# Patient Record
Sex: FEMALE | Race: WHITE | Employment: FULL TIME | URBAN - METROPOLITAN AREA
[De-identification: names, ages, dates, MRNs, and addresses within clinical notes are randomized per-mention and may not be internally consistent; named-entity substitution may affect disease eponyms.]

---

## 2017-04-04 ENCOUNTER — COMMUNICATION - HEALTHEAST (OUTPATIENT)
Dept: INTERNAL MEDICINE | Facility: CLINIC | Age: 42
End: 2017-04-04

## 2017-04-06 ENCOUNTER — AMBULATORY - HEALTHEAST (OUTPATIENT)
Dept: INTERNAL MEDICINE | Facility: CLINIC | Age: 42
End: 2017-04-06

## 2017-04-06 DIAGNOSIS — E11.9 TYPE 2 DIABETES MELLITUS (H): ICD-10-CM

## 2017-04-06 DIAGNOSIS — E11.9 DIABETES MELLITUS TYPE 2, CONTROLLED (H): ICD-10-CM

## 2017-04-09 ENCOUNTER — COMMUNICATION - HEALTHEAST (OUTPATIENT)
Dept: INTERNAL MEDICINE | Facility: CLINIC | Age: 42
End: 2017-04-09

## 2017-04-09 DIAGNOSIS — E11.9 TYPE 2 DIABETES MELLITUS (H): ICD-10-CM

## 2017-05-06 ENCOUNTER — COMMUNICATION - HEALTHEAST (OUTPATIENT)
Dept: INTERNAL MEDICINE | Facility: CLINIC | Age: 42
End: 2017-05-06

## 2017-05-06 DIAGNOSIS — E11.9 TYPE 2 DIABETES MELLITUS (H): ICD-10-CM

## 2017-06-17 ENCOUNTER — COMMUNICATION - HEALTHEAST (OUTPATIENT)
Dept: INTERNAL MEDICINE | Facility: CLINIC | Age: 42
End: 2017-06-17

## 2017-06-17 DIAGNOSIS — E11.9 DIABETES (H): ICD-10-CM

## 2017-08-01 ENCOUNTER — COMMUNICATION - HEALTHEAST (OUTPATIENT)
Dept: INTERNAL MEDICINE | Facility: CLINIC | Age: 42
End: 2017-08-01

## 2017-08-14 ENCOUNTER — RECORDS - HEALTHEAST (OUTPATIENT)
Dept: ADMINISTRATIVE | Facility: OTHER | Age: 42
End: 2017-08-14

## 2017-10-15 ENCOUNTER — COMMUNICATION - HEALTHEAST (OUTPATIENT)
Dept: INTERNAL MEDICINE | Facility: CLINIC | Age: 42
End: 2017-10-15

## 2017-10-15 DIAGNOSIS — E11.9 DIABETES (H): ICD-10-CM

## 2017-11-02 ENCOUNTER — COMMUNICATION - HEALTHEAST (OUTPATIENT)
Dept: INTERNAL MEDICINE | Facility: CLINIC | Age: 42
End: 2017-11-02

## 2017-11-02 DIAGNOSIS — E78.5 HYPERLIPIDEMIA: ICD-10-CM

## 2018-01-12 ENCOUNTER — COMMUNICATION - HEALTHEAST (OUTPATIENT)
Dept: INTERNAL MEDICINE | Facility: CLINIC | Age: 43
End: 2018-01-12

## 2018-01-12 DIAGNOSIS — E11.9 TYPE 2 DIABETES MELLITUS (H): ICD-10-CM

## 2018-02-13 ENCOUNTER — COMMUNICATION - HEALTHEAST (OUTPATIENT)
Dept: INTERNAL MEDICINE | Facility: CLINIC | Age: 43
End: 2018-02-13

## 2018-02-13 DIAGNOSIS — E78.5 HYPERLIPIDEMIA: ICD-10-CM

## 2018-02-17 ENCOUNTER — COMMUNICATION - HEALTHEAST (OUTPATIENT)
Dept: INTERNAL MEDICINE | Facility: CLINIC | Age: 43
End: 2018-02-17

## 2018-02-17 DIAGNOSIS — E11.9 DIABETES (H): ICD-10-CM

## 2018-03-19 ENCOUNTER — COMMUNICATION - HEALTHEAST (OUTPATIENT)
Dept: INTERNAL MEDICINE | Facility: CLINIC | Age: 43
End: 2018-03-19

## 2018-03-19 DIAGNOSIS — E78.5 HYPERLIPIDEMIA: ICD-10-CM

## 2018-03-19 DIAGNOSIS — E11.9 DIABETES (H): ICD-10-CM

## 2018-04-15 ENCOUNTER — COMMUNICATION - HEALTHEAST (OUTPATIENT)
Dept: INTERNAL MEDICINE | Facility: CLINIC | Age: 43
End: 2018-04-15

## 2018-04-15 DIAGNOSIS — E11.9 TYPE 2 DIABETES MELLITUS (H): ICD-10-CM

## 2020-02-13 ENCOUNTER — OFFICE VISIT - HEALTHEAST (OUTPATIENT)
Dept: INTERNAL MEDICINE | Facility: CLINIC | Age: 45
End: 2020-02-13

## 2020-02-13 DIAGNOSIS — G47.00 INSOMNIA, UNSPECIFIED TYPE: ICD-10-CM

## 2020-02-13 DIAGNOSIS — Z23 NEED FOR IMMUNIZATION AGAINST INFLUENZA: ICD-10-CM

## 2020-02-13 DIAGNOSIS — Z12.31 VISIT FOR SCREENING MAMMOGRAM: ICD-10-CM

## 2020-02-13 DIAGNOSIS — Z72.0 TOBACCO ABUSE: ICD-10-CM

## 2020-02-13 DIAGNOSIS — E66.812 CLASS 2 SEVERE OBESITY DUE TO EXCESS CALORIES WITH SERIOUS COMORBIDITY AND BODY MASS INDEX (BMI) OF 38.0 TO 38.9 IN ADULT (H): ICD-10-CM

## 2020-02-13 DIAGNOSIS — F41.1 ANXIETY STATE: ICD-10-CM

## 2020-02-13 DIAGNOSIS — Z00.00 ROUTINE HISTORY AND PHYSICAL EXAMINATION OF ADULT: ICD-10-CM

## 2020-02-13 DIAGNOSIS — E66.01 CLASS 2 SEVERE OBESITY DUE TO EXCESS CALORIES WITH SERIOUS COMORBIDITY AND BODY MASS INDEX (BMI) OF 38.0 TO 38.9 IN ADULT (H): ICD-10-CM

## 2020-02-13 DIAGNOSIS — E78.2 MIXED HYPERLIPIDEMIA: ICD-10-CM

## 2020-02-13 DIAGNOSIS — E11.69 TYPE 2 DIABETES MELLITUS WITH OTHER SPECIFIED COMPLICATION, WITHOUT LONG-TERM CURRENT USE OF INSULIN (H): ICD-10-CM

## 2020-02-13 LAB
ALBUMIN SERPL-MCNC: 4 G/DL (ref 3.5–5)
ALP SERPL-CCNC: 108 U/L (ref 45–120)
ALT SERPL W P-5'-P-CCNC: 28 U/L (ref 0–45)
ANION GAP SERPL CALCULATED.3IONS-SCNC: 9 MMOL/L (ref 5–18)
AST SERPL W P-5'-P-CCNC: 15 U/L (ref 0–40)
BASOPHILS # BLD AUTO: 0.1 THOU/UL (ref 0–0.2)
BASOPHILS NFR BLD AUTO: 1 % (ref 0–2)
BILIRUB SERPL-MCNC: 0.4 MG/DL (ref 0–1)
BUN SERPL-MCNC: 11 MG/DL (ref 8–22)
CALCIUM SERPL-MCNC: 9.5 MG/DL (ref 8.5–10.5)
CHLORIDE BLD-SCNC: 102 MMOL/L (ref 98–107)
CHOLEST SERPL-MCNC: 208 MG/DL
CO2 SERPL-SCNC: 26 MMOL/L (ref 22–31)
CREAT SERPL-MCNC: 0.7 MG/DL (ref 0.6–1.1)
CREAT UR-MCNC: 147.6 MG/DL
EOSINOPHIL # BLD AUTO: 0.3 THOU/UL (ref 0–0.4)
EOSINOPHIL NFR BLD AUTO: 3 % (ref 0–6)
ERYTHROCYTE [DISTWIDTH] IN BLOOD BY AUTOMATED COUNT: 11.1 % (ref 11–14.5)
FASTING STATUS PATIENT QL REPORTED: YES
GFR SERPL CREATININE-BSD FRML MDRD: >60 ML/MIN/1.73M2
GLUCOSE BLD-MCNC: 200 MG/DL (ref 70–125)
HBA1C MFR BLD: 9.8 % (ref 3.5–6)
HCT VFR BLD AUTO: 48.2 % (ref 35–47)
HDLC SERPL-MCNC: 40 MG/DL
HGB BLD-MCNC: 15.7 G/DL (ref 12–16)
LDLC SERPL CALC-MCNC: 122 MG/DL
LYMPHOCYTES # BLD AUTO: 3 THOU/UL (ref 0.8–4.4)
LYMPHOCYTES NFR BLD AUTO: 26 % (ref 20–40)
MCH RBC QN AUTO: 30.3 PG (ref 27–34)
MCHC RBC AUTO-ENTMCNC: 32.5 G/DL (ref 32–36)
MCV RBC AUTO: 93 FL (ref 80–100)
MICROALBUMIN UR-MCNC: 2.67 MG/DL (ref 0–1.99)
MICROALBUMIN/CREAT UR: 18.1 MG/G
MONOCYTES # BLD AUTO: 0.6 THOU/UL (ref 0–0.9)
MONOCYTES NFR BLD AUTO: 6 % (ref 2–10)
NEUTROPHILS # BLD AUTO: 7.7 THOU/UL (ref 2–7.7)
NEUTROPHILS NFR BLD AUTO: 66 % (ref 50–70)
PLATELET # BLD AUTO: 295 THOU/UL (ref 140–440)
PMV BLD AUTO: 9 FL (ref 7–10)
POTASSIUM BLD-SCNC: 4.3 MMOL/L (ref 3.5–5)
PROT SERPL-MCNC: 7.2 G/DL (ref 6–8)
RBC # BLD AUTO: 5.16 MILL/UL (ref 3.8–5.4)
SODIUM SERPL-SCNC: 137 MMOL/L (ref 136–145)
TRIGL SERPL-MCNC: 229 MG/DL
TSH SERPL DL<=0.005 MIU/L-ACNC: 2.07 UIU/ML (ref 0.3–5)
URATE SERPL-MCNC: 3.4 MG/DL (ref 2–7.5)
WBC: 11.7 THOU/UL (ref 4–11)

## 2020-02-13 RX ORDER — MIRTAZAPINE 15 MG/1
TABLET, FILM COATED ORAL
Status: SHIPPED | COMMUNITY
Start: 2020-01-01

## 2020-02-13 RX ORDER — DULOXETIN HYDROCHLORIDE 60 MG/1
120 CAPSULE, DELAYED RELEASE ORAL DAILY
Status: SHIPPED | COMMUNITY
Start: 2020-01-01

## 2020-02-13 ASSESSMENT — ANXIETY QUESTIONNAIRES
1. FEELING NERVOUS, ANXIOUS, OR ON EDGE: NEARLY EVERY DAY
4. TROUBLE RELAXING: MORE THAN HALF THE DAYS
IF YOU CHECKED OFF ANY PROBLEMS ON THIS QUESTIONNAIRE, HOW DIFFICULT HAVE THESE PROBLEMS MADE IT FOR YOU TO DO YOUR WORK, TAKE CARE OF THINGS AT HOME, OR GET ALONG WITH OTHER PEOPLE: SOMEWHAT DIFFICULT
3. WORRYING TOO MUCH ABOUT DIFFERENT THINGS: SEVERAL DAYS
GAD7 TOTAL SCORE: 10
5. BEING SO RESTLESS THAT IT IS HARD TO SIT STILL: MORE THAN HALF THE DAYS
7. FEELING AFRAID AS IF SOMETHING AWFUL MIGHT HAPPEN: NOT AT ALL
6. BECOMING EASILY ANNOYED OR IRRITABLE: SEVERAL DAYS
2. NOT BEING ABLE TO STOP OR CONTROL WORRYING: SEVERAL DAYS

## 2020-02-13 ASSESSMENT — PATIENT HEALTH QUESTIONNAIRE - PHQ9: SUM OF ALL RESPONSES TO PHQ QUESTIONS 1-9: 9

## 2020-02-14 ENCOUNTER — COMMUNICATION - HEALTHEAST (OUTPATIENT)
Dept: INTERNAL MEDICINE | Facility: CLINIC | Age: 45
End: 2020-02-14

## 2020-02-14 ENCOUNTER — AMBULATORY - HEALTHEAST (OUTPATIENT)
Dept: INTERNAL MEDICINE | Facility: CLINIC | Age: 45
End: 2020-02-14

## 2020-02-14 DIAGNOSIS — R80.9 MICROALBUMINURIA DUE TO TYPE 2 DIABETES MELLITUS (H): ICD-10-CM

## 2020-02-14 DIAGNOSIS — E11.69 TYPE 2 DIABETES MELLITUS WITH OTHER SPECIFIED COMPLICATION, WITHOUT LONG-TERM CURRENT USE OF INSULIN (H): ICD-10-CM

## 2020-02-14 DIAGNOSIS — E11.29 MICROALBUMINURIA DUE TO TYPE 2 DIABETES MELLITUS (H): ICD-10-CM

## 2020-02-14 DIAGNOSIS — E78.2 MIXED HYPERLIPIDEMIA: ICD-10-CM

## 2020-02-14 LAB — 25(OH)D3 SERPL-MCNC: 29.4 NG/ML (ref 30–80)

## 2020-02-20 ENCOUNTER — COMMUNICATION - HEALTHEAST (OUTPATIENT)
Dept: INTERNAL MEDICINE | Facility: CLINIC | Age: 45
End: 2020-02-20

## 2020-02-20 ENCOUNTER — OFFICE VISIT - HEALTHEAST (OUTPATIENT)
Dept: INTERNAL MEDICINE | Facility: CLINIC | Age: 45
End: 2020-02-20

## 2020-02-20 ENCOUNTER — RECORDS - HEALTHEAST (OUTPATIENT)
Dept: GENERAL RADIOLOGY | Facility: CLINIC | Age: 45
End: 2020-02-20

## 2020-02-20 DIAGNOSIS — R80.9 MICROALBUMINURIA DUE TO TYPE 2 DIABETES MELLITUS (H): ICD-10-CM

## 2020-02-20 DIAGNOSIS — M25.562 CHRONIC PAIN OF LEFT KNEE: ICD-10-CM

## 2020-02-20 DIAGNOSIS — F41.1 ANXIETY STATE: ICD-10-CM

## 2020-02-20 DIAGNOSIS — E11.69 TYPE 2 DIABETES MELLITUS WITH OTHER SPECIFIED COMPLICATION, WITHOUT LONG-TERM CURRENT USE OF INSULIN (H): ICD-10-CM

## 2020-02-20 DIAGNOSIS — E78.2 MIXED HYPERLIPIDEMIA: ICD-10-CM

## 2020-02-20 DIAGNOSIS — M17.12 PRIMARY OSTEOARTHRITIS OF LEFT KNEE: ICD-10-CM

## 2020-02-20 DIAGNOSIS — G47.00 INSOMNIA, UNSPECIFIED TYPE: ICD-10-CM

## 2020-02-20 DIAGNOSIS — G89.29 CHRONIC PAIN OF LEFT KNEE: ICD-10-CM

## 2020-02-20 DIAGNOSIS — Z72.0 TOBACCO ABUSE: ICD-10-CM

## 2020-02-20 DIAGNOSIS — E66.812 CLASS 2 SEVERE OBESITY DUE TO EXCESS CALORIES WITH SERIOUS COMORBIDITY AND BODY MASS INDEX (BMI) OF 38.0 TO 38.9 IN ADULT (H): ICD-10-CM

## 2020-02-20 DIAGNOSIS — E11.29 MICROALBUMINURIA DUE TO TYPE 2 DIABETES MELLITUS (H): ICD-10-CM

## 2020-02-20 DIAGNOSIS — E66.01 CLASS 2 SEVERE OBESITY DUE TO EXCESS CALORIES WITH SERIOUS COMORBIDITY AND BODY MASS INDEX (BMI) OF 38.0 TO 38.9 IN ADULT (H): ICD-10-CM

## 2020-02-20 DIAGNOSIS — M17.12 UNILATERAL PRIMARY OSTEOARTHRITIS, LEFT KNEE: ICD-10-CM

## 2020-03-02 ENCOUNTER — COMMUNICATION - HEALTHEAST (OUTPATIENT)
Dept: ADMINISTRATIVE | Facility: CLINIC | Age: 45
End: 2020-03-02

## 2020-03-10 ENCOUNTER — OFFICE VISIT - HEALTHEAST (OUTPATIENT)
Dept: INTERNAL MEDICINE | Facility: CLINIC | Age: 45
End: 2020-03-10

## 2020-03-10 DIAGNOSIS — Z63.6 CAREGIVER BURDEN: ICD-10-CM

## 2020-03-10 SDOH — SOCIAL STABILITY - SOCIAL INSECURITY: DEPENDENT RELATIVE NEEDING CARE AT HOME: Z63.6

## 2020-03-10 ASSESSMENT — MIFFLIN-ST. JEOR: SCORE: 1523

## 2020-05-27 ENCOUNTER — OFFICE VISIT - HEALTHEAST (OUTPATIENT)
Dept: INTERNAL MEDICINE | Facility: CLINIC | Age: 45
End: 2020-05-27

## 2020-05-27 DIAGNOSIS — G47.00 INSOMNIA, UNSPECIFIED TYPE: ICD-10-CM

## 2020-05-27 DIAGNOSIS — I10 BENIGN ESSENTIAL HYPERTENSION: ICD-10-CM

## 2020-05-27 DIAGNOSIS — R80.9 MICROALBUMINURIA DUE TO TYPE 2 DIABETES MELLITUS (H): ICD-10-CM

## 2020-05-27 DIAGNOSIS — F41.1 ANXIETY STATE: ICD-10-CM

## 2020-05-27 DIAGNOSIS — M17.12 OSTEOARTHRITIS OF LEFT KNEE, UNSPECIFIED OSTEOARTHRITIS TYPE: ICD-10-CM

## 2020-05-27 DIAGNOSIS — E11.29 MICROALBUMINURIA DUE TO TYPE 2 DIABETES MELLITUS (H): ICD-10-CM

## 2020-05-27 DIAGNOSIS — E78.2 MIXED HYPERLIPIDEMIA: ICD-10-CM

## 2020-05-27 DIAGNOSIS — E11.29 TYPE 2 DIABETES MELLITUS WITH OTHER DIABETIC KIDNEY COMPLICATION, WITHOUT LONG-TERM CURRENT USE OF INSULIN (H): ICD-10-CM

## 2020-05-27 DIAGNOSIS — Z72.0 TOBACCO ABUSE: ICD-10-CM

## 2020-05-27 DIAGNOSIS — M48.062 SPINAL STENOSIS, LUMBAR REGION, WITH NEUROGENIC CLAUDICATION: ICD-10-CM

## 2020-05-27 DIAGNOSIS — E66.812 CLASS 2 SEVERE OBESITY DUE TO EXCESS CALORIES WITH SERIOUS COMORBIDITY AND BODY MASS INDEX (BMI) OF 38.0 TO 38.9 IN ADULT (H): ICD-10-CM

## 2020-05-27 DIAGNOSIS — E66.01 CLASS 2 SEVERE OBESITY DUE TO EXCESS CALORIES WITH SERIOUS COMORBIDITY AND BODY MASS INDEX (BMI) OF 38.0 TO 38.9 IN ADULT (H): ICD-10-CM

## 2020-05-27 LAB
ANION GAP SERPL CALCULATED.3IONS-SCNC: 10 MMOL/L (ref 5–18)
BUN SERPL-MCNC: 14 MG/DL (ref 8–22)
CALCIUM SERPL-MCNC: 9.6 MG/DL (ref 8.5–10.5)
CHLORIDE BLD-SCNC: 104 MMOL/L (ref 98–107)
CO2 SERPL-SCNC: 21 MMOL/L (ref 22–31)
CREAT SERPL-MCNC: 0.72 MG/DL (ref 0.6–1.1)
GFR SERPL CREATININE-BSD FRML MDRD: >60 ML/MIN/1.73M2
GLUCOSE BLD-MCNC: 238 MG/DL (ref 70–125)
HBA1C MFR BLD: 9.7 % (ref 3.5–6)
POTASSIUM BLD-SCNC: 4.9 MMOL/L (ref 3.5–5)
SODIUM SERPL-SCNC: 135 MMOL/L (ref 136–145)

## 2020-05-27 RX ORDER — BENZTROPINE MESYLATE 0.5 MG/1
TABLET ORAL
Status: SHIPPED | COMMUNITY
Start: 2020-05-20

## 2020-05-28 ENCOUNTER — COMMUNICATION - HEALTHEAST (OUTPATIENT)
Dept: INTERNAL MEDICINE | Facility: CLINIC | Age: 45
End: 2020-05-28

## 2020-05-28 ENCOUNTER — AMBULATORY - HEALTHEAST (OUTPATIENT)
Dept: INTERNAL MEDICINE | Facility: CLINIC | Age: 45
End: 2020-05-28

## 2020-05-28 DIAGNOSIS — E11.29 TYPE 2 DIABETES MELLITUS WITH OTHER DIABETIC KIDNEY COMPLICATION, WITHOUT LONG-TERM CURRENT USE OF INSULIN (H): ICD-10-CM

## 2020-08-27 ENCOUNTER — OFFICE VISIT - HEALTHEAST (OUTPATIENT)
Dept: INTERNAL MEDICINE | Facility: CLINIC | Age: 45
End: 2020-08-27

## 2020-08-27 ENCOUNTER — COMMUNICATION - HEALTHEAST (OUTPATIENT)
Dept: INTERNAL MEDICINE | Facility: CLINIC | Age: 45
End: 2020-08-27

## 2020-08-27 DIAGNOSIS — G47.00 INSOMNIA, UNSPECIFIED TYPE: ICD-10-CM

## 2020-08-27 DIAGNOSIS — I10 BENIGN ESSENTIAL HYPERTENSION: ICD-10-CM

## 2020-08-27 DIAGNOSIS — F41.1 ANXIETY STATE: ICD-10-CM

## 2020-08-27 DIAGNOSIS — E11.29 MICROALBUMINURIA DUE TO TYPE 2 DIABETES MELLITUS (H): ICD-10-CM

## 2020-08-27 DIAGNOSIS — R80.9 MICROALBUMINURIA DUE TO TYPE 2 DIABETES MELLITUS (H): ICD-10-CM

## 2020-08-27 DIAGNOSIS — E78.2 MIXED HYPERLIPIDEMIA: ICD-10-CM

## 2020-08-27 DIAGNOSIS — Z72.0 TOBACCO ABUSE: ICD-10-CM

## 2020-08-27 DIAGNOSIS — E66.01 CLASS 2 SEVERE OBESITY DUE TO EXCESS CALORIES WITH SERIOUS COMORBIDITY AND BODY MASS INDEX (BMI) OF 38.0 TO 38.9 IN ADULT (H): ICD-10-CM

## 2020-08-27 DIAGNOSIS — E11.29 TYPE 2 DIABETES MELLITUS WITH OTHER DIABETIC KIDNEY COMPLICATION, WITHOUT LONG-TERM CURRENT USE OF INSULIN (H): ICD-10-CM

## 2020-08-27 DIAGNOSIS — M17.12 OSTEOARTHRITIS OF LEFT KNEE, UNSPECIFIED OSTEOARTHRITIS TYPE: ICD-10-CM

## 2020-08-27 DIAGNOSIS — E66.812 CLASS 2 SEVERE OBESITY DUE TO EXCESS CALORIES WITH SERIOUS COMORBIDITY AND BODY MASS INDEX (BMI) OF 38.0 TO 38.9 IN ADULT (H): ICD-10-CM

## 2020-08-27 LAB
ANION GAP SERPL CALCULATED.3IONS-SCNC: 11 MMOL/L (ref 5–18)
BUN SERPL-MCNC: 9 MG/DL (ref 8–22)
CALCIUM SERPL-MCNC: 9.8 MG/DL (ref 8.5–10.5)
CHLORIDE BLD-SCNC: 104 MMOL/L (ref 98–107)
CO2 SERPL-SCNC: 23 MMOL/L (ref 22–31)
CREAT SERPL-MCNC: 0.73 MG/DL (ref 0.6–1.1)
GFR SERPL CREATININE-BSD FRML MDRD: >60 ML/MIN/1.73M2
GLUCOSE BLD-MCNC: 126 MG/DL (ref 70–125)
HBA1C MFR BLD: 9.3 %
POTASSIUM BLD-SCNC: 4.4 MMOL/L (ref 3.5–5)
SODIUM SERPL-SCNC: 138 MMOL/L (ref 136–145)

## 2020-10-15 ENCOUNTER — RECORDS - HEALTHEAST (OUTPATIENT)
Dept: ADMINISTRATIVE | Facility: OTHER | Age: 45
End: 2020-10-15

## 2021-03-12 ENCOUNTER — COMMUNICATION - HEALTHEAST (OUTPATIENT)
Dept: INTERNAL MEDICINE | Facility: CLINIC | Age: 46
End: 2021-03-12

## 2021-03-12 DIAGNOSIS — E11.69 TYPE 2 DIABETES MELLITUS WITH OTHER SPECIFIED COMPLICATION, WITHOUT LONG-TERM CURRENT USE OF INSULIN (H): ICD-10-CM

## 2021-03-12 DIAGNOSIS — E11.29 TYPE 2 DIABETES MELLITUS WITH OTHER DIABETIC KIDNEY COMPLICATION (H): ICD-10-CM

## 2021-03-12 DIAGNOSIS — E78.2 MIXED HYPERLIPIDEMIA: ICD-10-CM

## 2021-03-12 RX ORDER — ATORVASTATIN CALCIUM 20 MG/1
TABLET, FILM COATED ORAL
Qty: 90 TABLET | Refills: 3 | Status: SHIPPED | OUTPATIENT
Start: 2021-03-12 | End: 2022-04-15

## 2021-03-12 RX ORDER — LISINOPRIL 10 MG/1
TABLET ORAL
Qty: 90 TABLET | Refills: 3 | Status: SHIPPED | OUTPATIENT
Start: 2021-03-12 | End: 2022-04-15

## 2021-03-17 ENCOUNTER — OFFICE VISIT - HEALTHEAST (OUTPATIENT)
Dept: INTERNAL MEDICINE | Facility: CLINIC | Age: 46
End: 2021-03-17

## 2021-03-17 DIAGNOSIS — E11.29 MICROALBUMINURIA DUE TO TYPE 2 DIABETES MELLITUS (H): ICD-10-CM

## 2021-03-17 DIAGNOSIS — I10 BENIGN ESSENTIAL HYPERTENSION: ICD-10-CM

## 2021-03-17 DIAGNOSIS — E11.29 TYPE 2 DIABETES MELLITUS WITH OTHER DIABETIC KIDNEY COMPLICATION, WITHOUT LONG-TERM CURRENT USE OF INSULIN (H): ICD-10-CM

## 2021-03-17 DIAGNOSIS — M17.12 OSTEOARTHRITIS OF LEFT KNEE, UNSPECIFIED OSTEOARTHRITIS TYPE: ICD-10-CM

## 2021-03-17 DIAGNOSIS — E78.2 MIXED HYPERLIPIDEMIA: ICD-10-CM

## 2021-03-17 DIAGNOSIS — Z72.0 TOBACCO ABUSE: ICD-10-CM

## 2021-03-17 DIAGNOSIS — M48.062 SPINAL STENOSIS, LUMBAR REGION, WITH NEUROGENIC CLAUDICATION: ICD-10-CM

## 2021-03-17 DIAGNOSIS — Z12.31 VISIT FOR SCREENING MAMMOGRAM: ICD-10-CM

## 2021-03-17 DIAGNOSIS — F41.1 ANXIETY STATE: ICD-10-CM

## 2021-03-17 DIAGNOSIS — E66.01 MORBID OBESITY (H): ICD-10-CM

## 2021-03-17 DIAGNOSIS — R80.9 MICROALBUMINURIA DUE TO TYPE 2 DIABETES MELLITUS (H): ICD-10-CM

## 2021-03-17 LAB
ALBUMIN SERPL-MCNC: 3.8 G/DL (ref 3.5–5)
ALP SERPL-CCNC: 125 U/L (ref 45–120)
ALT SERPL W P-5'-P-CCNC: 45 U/L (ref 0–45)
ANION GAP SERPL CALCULATED.3IONS-SCNC: 10 MMOL/L (ref 5–18)
AST SERPL W P-5'-P-CCNC: 26 U/L (ref 0–40)
BILIRUB SERPL-MCNC: 0.3 MG/DL (ref 0–1)
BUN SERPL-MCNC: 9 MG/DL (ref 8–22)
CALCIUM SERPL-MCNC: 9.2 MG/DL (ref 8.5–10.5)
CHLORIDE BLD-SCNC: 104 MMOL/L (ref 98–107)
CHOLEST SERPL-MCNC: 135 MG/DL
CO2 SERPL-SCNC: 24 MMOL/L (ref 22–31)
CREAT SERPL-MCNC: 0.67 MG/DL (ref 0.6–1.1)
ERYTHROCYTE [DISTWIDTH] IN BLOOD BY AUTOMATED COUNT: 12.3 % (ref 11–14.5)
FASTING STATUS PATIENT QL REPORTED: YES
GFR SERPL CREATININE-BSD FRML MDRD: >60 ML/MIN/1.73M2
GLUCOSE BLD-MCNC: 130 MG/DL (ref 70–125)
HBA1C MFR BLD: 7.9 %
HCT VFR BLD AUTO: 42.9 % (ref 35–47)
HDLC SERPL-MCNC: 39 MG/DL
HGB BLD-MCNC: 14.5 G/DL (ref 12–16)
LDLC SERPL CALC-MCNC: 66 MG/DL
MCH RBC QN AUTO: 31.4 PG (ref 27–34)
MCHC RBC AUTO-ENTMCNC: 33.8 G/DL (ref 32–36)
MCV RBC AUTO: 93 FL (ref 80–100)
PLATELET # BLD AUTO: 335 THOU/UL (ref 140–440)
PMV BLD AUTO: 11.1 FL (ref 7–10)
POTASSIUM BLD-SCNC: 4.4 MMOL/L (ref 3.5–5)
PROT SERPL-MCNC: 6.7 G/DL (ref 6–8)
RBC # BLD AUTO: 4.62 MILL/UL (ref 3.8–5.4)
SODIUM SERPL-SCNC: 138 MMOL/L (ref 136–145)
TRIGL SERPL-MCNC: 149 MG/DL
TSH SERPL DL<=0.005 MIU/L-ACNC: 0.9 UIU/ML (ref 0.3–5)
WBC: 11.8 THOU/UL (ref 4–11)

## 2021-03-17 RX ORDER — DULAGLUTIDE 1.5 MG/.5ML
1.5 INJECTION, SOLUTION SUBCUTANEOUS
Qty: 12 SYRINGE | Refills: 3 | Status: SHIPPED | OUTPATIENT
Start: 2021-03-17 | End: 2022-04-15 | Stop reason: SINTOL

## 2021-03-18 ENCOUNTER — COMMUNICATION - HEALTHEAST (OUTPATIENT)
Dept: INTERNAL MEDICINE | Facility: CLINIC | Age: 46
End: 2021-03-18

## 2021-03-18 LAB — 25(OH)D3 SERPL-MCNC: 37.2 NG/ML (ref 30–80)

## 2021-03-20 ENCOUNTER — COMMUNICATION - HEALTHEAST (OUTPATIENT)
Dept: INTERNAL MEDICINE | Facility: CLINIC | Age: 46
End: 2021-03-20

## 2021-03-20 DIAGNOSIS — E11.69 TYPE 2 DIABETES MELLITUS WITH OTHER SPECIFIED COMPLICATION, WITHOUT LONG-TERM CURRENT USE OF INSULIN (H): ICD-10-CM

## 2021-03-22 RX ORDER — BLOOD SUGAR DIAGNOSTIC
STRIP MISCELLANEOUS
Qty: 200 STRIP | Refills: 3 | Status: SHIPPED | OUTPATIENT
Start: 2021-03-22

## 2021-04-29 ENCOUNTER — AMBULATORY - HEALTHEAST (OUTPATIENT)
Dept: NURSING | Facility: CLINIC | Age: 46
End: 2021-04-29

## 2021-05-20 ENCOUNTER — AMBULATORY - HEALTHEAST (OUTPATIENT)
Dept: NURSING | Facility: CLINIC | Age: 46
End: 2021-05-20

## 2021-05-26 ASSESSMENT — PATIENT HEALTH QUESTIONNAIRE - PHQ9: SUM OF ALL RESPONSES TO PHQ QUESTIONS 1-9: 9

## 2021-05-28 ASSESSMENT — ANXIETY QUESTIONNAIRES: GAD7 TOTAL SCORE: 10

## 2021-06-04 VITALS
DIASTOLIC BLOOD PRESSURE: 79 MMHG | BODY MASS INDEX: 37.07 KG/M2 | HEART RATE: 79 BPM | WEIGHT: 202.7 LBS | SYSTOLIC BLOOD PRESSURE: 125 MMHG | TEMPERATURE: 98 F | OXYGEN SATURATION: 98 %

## 2021-06-04 VITALS
SYSTOLIC BLOOD PRESSURE: 120 MMHG | HEART RATE: 120 BPM | WEIGHT: 199.3 LBS | BODY MASS INDEX: 36.45 KG/M2 | OXYGEN SATURATION: 98 % | TEMPERATURE: 98.4 F | DIASTOLIC BLOOD PRESSURE: 80 MMHG

## 2021-06-04 VITALS
OXYGEN SATURATION: 96 % | SYSTOLIC BLOOD PRESSURE: 130 MMHG | HEIGHT: 62 IN | DIASTOLIC BLOOD PRESSURE: 85 MMHG | BODY MASS INDEX: 37.76 KG/M2 | HEART RATE: 85 BPM | WEIGHT: 205.2 LBS

## 2021-06-04 VITALS
SYSTOLIC BLOOD PRESSURE: 132 MMHG | OXYGEN SATURATION: 96 % | HEART RATE: 94 BPM | BODY MASS INDEX: 37.39 KG/M2 | WEIGHT: 206.1 LBS | TEMPERATURE: 98.6 F | DIASTOLIC BLOOD PRESSURE: 88 MMHG

## 2021-06-04 VITALS — DIASTOLIC BLOOD PRESSURE: 62 MMHG | WEIGHT: 206.5 LBS | SYSTOLIC BLOOD PRESSURE: 90 MMHG | BODY MASS INDEX: 37.47 KG/M2

## 2021-06-06 NOTE — PROGRESS NOTES
Office Visit - Follow Up   Elissa Grajeda   44 y.o. female    Date of Visit: 2/13/2020    Chief Complaint   Patient presents with     Joint Pain     Lab Request     Fasting        Assessment and Plan     Reestablishing primary care at [facility] internal medicine, with a focus on diabetes and metabolic conditions, for this 44-year-old woman, whose  is also my patient, and who works as a Internet telecommuter.  Issues are type 2 diabetes, has been off of her metformin and glipizide for about a year and a half, and I am concerned that she may be experiencing neuropathy complications with pain in her legs and numbness in her fingers.  Hyperlipidemia, was on atorvastatin, has been off of that for about a year and a half.  Cigarette smoking, which definitely needs to stop in light of her diabetes.  Obesity with complications of diabetes and hyperlipidemia, needs to eventually lose about 50 pounds or more.  Anxiety, depression, insomnia, which are under reasonably good control now on a rather complex medication regimen, currently working with [facility] nurse practitioner Tito Purdy.  Chronic low back pain, for which she has been working with [facility] and was told about lumbar spinal stenosis that is noncritical.  Needs routine GYN care.    She is fasting this morning, so we will get comprehensive metabolic panel, lipid profile, A1c, blood cell counts, thyroid cascade, uric acid, urine microalbumin, and vitamin D level.  Consultation is requested for ophthalmology for routine diabetic eye exam, and also gynecology for routine care.  Diabetic nurse educator    Going issue by issue:    Type 2 diabetes, has been off of her metformin and glipizide for about a year and a half, and I am concerned that she may be experiencing neuropathy complications with pain in her legs and numbness in her fingers.  She told me that the metformin caused a lot of diarrhea, and she still has some  ongoing diarrhea.  I told her that diarrhea can also be a complication of poorly controlled diabetes.  Obviously we will check her metabolic status including A1c test.  Goal is an A1c less than 7.5, preferably less than 7.  I feel confident that I will ask her to try to start metformin again.  She is going to need medication on top of the metformin in all likelihood, and that could be a GLP-1 inhibitor such as Trulicity injected once a week, or Victoza injected daily.  She is way overdue for diabetic eye exam, so I have ordered that for her.  Also order diabetes nurse educator, so that she can get a re-grounding in healthy lifestyle habits and self-monitoring procedures.  I put in an order for her to get a new glucometer and testing supplies.  I want her to test twice a day, before breakfast every day, and then take readings at other times of the day so that we can see her pattern.    Hyperlipidemia, was on atorvastatin, has been off of that for about a year and a half.  In the context of diabetes, she definitely needs to be on a statin, with a goal of LDL cholesterol less than 70.    Cigarette smoking, which definitely needs to stop in light of her diabetes.  She smokes approximately about a pack a day, and at that level of consumption, she may have a degree of physiologic nicotine dependence.  I would offer for her nicotine patches and gum.  She is shaking her head.  Given her mental health conditions, I would not use Chantix.  If we do not use nicotine or Chantix, I think she is going to have to do this the old-fashioned way.    Obesity with complications of diabetes and hyperlipidemia, needs to eventually lose about 50 pounds or more.  She estimates that her weight is actually dropped by about 30 pounds over the last year.  That could be a good thing if that was done on purpose.  However it could also be a sign of poorly controlled diabetes.    Anxiety, depression, insomnia, which are under reasonably good  control now on a rather complex medication regimen, currently working with [facility] nurse practitioner Tito Purdy.  Her medication regimen is a rather complex when the consistent for drugs that are managed by MrYoung Rajwinder.  These are aripiprazole, duloxetine, gabapentin, and mirtazapine.  She told me that the combination is working well.  She does not think that any of these medications contributed to weight gain.    Chronic low back pain, for which she has been working with [facility] and was told about lumbar spinal stenosis.  We have not received any documents from [facility], and it would be useful to see the results of their imaging studies, which she recalls includes a lumbar spine MRI.  She was told that she has noncritical lumbar spinal stenosis.  She did start physical therapy at [facility], and I told her that she needs to do the home exercises, which she is currently not doing.  I told her that the leg pain could be from lumbar spinal stenosis, or radicular symptoms (nerve root pain from bad lumbar disks).  But it could also represent diabetic neuropathy.  I asked her to ask [facility] to send us their documents, even better yet she should get her own copy, and bring them here.    Needs routine GYN care, ordered    She is going to get a influenza vaccine today.    I like to see her back next week, so we can continue to build out the plan.           History of Present Illness   This 44 y.o. old Reestablishing primary care at [facility] internal medicine, with a focus on diabetes and metabolic conditions, for this 44-year-old woman, whose  is also my patient, and who works as a Internet telecommuter.  Issues are type 2 diabetes, has been off of her metformin and glipizide for about a year and a half, and I am concerned that she may be experiencing neuropathy complications with pain in her legs and numbness in her fingers.  Hyperlipidemia, was on  atorvastatin, has been off of that for about a year and a half.  Cigarette smoking, which definitely needs to stop in light of her diabetes.  Obesity with complications of diabetes and hyperlipidemia, needs to eventually lose about 50 pounds or more.  Anxiety, depression, insomnia, which are under reasonably good control now on a rather complex medication regimen, currently working with [facility] nurse practitioner Tito Purdy.  Chronic low back pain, for which she has been working with [facility] and was told about lumbar spinal stenosis that is noncritical.  Needs routine GYN care.    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     Medications, Allergies, Social, and Problem List   Current Outpatient Medications   Medication Sig Dispense Refill     ARIPiprazole (ABILIFY) 10 MG tablet Take 10 mg by mouth daily.        DULoxetine (CYMBALTA) 30 MG capsule TAKE 1 CAPSULE DAILY FOR A TOTAL DAILY DOSE OF 90 MG       DULoxetine (CYMBALTA) 60 MG capsule Take by mouth daily.       gabapentin (NEURONTIN) 300 MG capsule 600 mg 2 (two) times a day.  2     mirtazapine (REMERON) 15 MG tablet TAKE 1 TABLET BY MOUTH EVERYDAY AT BEDTIME       No current facility-administered medications for this visit.      No Known Allergies  Social History     Tobacco Use     Smoking status: Current Every Day Smoker     Packs/day: 1.00     Years: 20.00     Pack years: 20.00     Types: Cigarettes     Smokeless tobacco: Never Used   Substance Use Topics     Alcohol use: Not on file     Drug  use: Not on file     Patient Active Problem List   Diagnosis     Type 2 diabetes mellitus (H)     Anxiety     Insomnia     Hyperlipidemia     Tobacco abuse     Obesity        Reviewed, reconciled and updated       Physical Exam   General Appearance: Very pleasant, good historian, normal mental status, quite severely overweight    /79 (Patient Site: Right Arm, Patient Position: Sitting, Cuff Size: Adult Large)   Pulse 79   Temp 98  F (36.7  C) (Oral)   Wt 202 lb 11.2 oz (91.9 kg)   SpO2 98%   BMI 37.07 kg/m      General: Alert, in no distress  Skin: No significant lesion seen.  Eyes/nose/throat: Eyes without scleral icterus, eye movements normal, pupils equal and reactive, oropharynx clear, ears with normal TM's  MSK: Neck with good ROM  Lymphatic: Neck without adenopathy or masses  Endocrine: Thyroid with no nodules to palpation  Pulm: Lungs clear to auscultation bilaterally  Cardiac: Heart with regular rate and rhythm, no murmur or gallop  GI: Abdomen obese, soft, nontender. No palpable enlargement of liver or spleen  MSK: Extremities no tenderness or edema  Neuro: Moves all extremities, without focal weakness  Psych: Alert, normal mental status. Normal affect and speech       Additional Information   I spent 45 minutes face time with the patient, with > 50% counseling, explaining and discussing with the patient the issues enumerated in the Assessment and Plan section of this note and answering questions. Afterwards, the patient was given a printout of the AVS, with attention to the content in the Patient Instruction section.       Umer Vega MD

## 2021-06-06 NOTE — PROGRESS NOTES
Office Visit - Follow Up   Elissa VALDERRAMA Rabelenbibjacqueline   44 y.o. female    Date of Visit: 3/10/2020    Chief Complaint   Patient presents with     Fmla Paperwork        Assessment and Plan     The [place] paperwork is really so she can get the time away to take care of her  Ad, who is also my patient.    During our allotted time, I actually worked on Ad, so no charge for Elissa's visit.

## 2021-06-06 NOTE — PROGRESS NOTES
Office Visit - Follow Up   Elissa Grajeda   44 y.o. female    Date of Visit: 2/20/2020    Chief Complaint   Patient presents with     Follow-up     Knee Pain     Left x 1 week        Assessment and Plan     Symptomatically stable, has restarted on medications for type 2 diabetes, hypertension, and hyperlipidemia, since our initial visit on February 13.    Type 2 diabetes, possible neuropathy complications with pain in her legs and numbness in her fingers; microalbuminuria demonstrated February 13, 2020.     Her A1c test turned out expectedly elevated, but not too terrible.  Lab Results   Component Value Date    HGBA1C 9.8 (H) 02/13/2020   Goal is to eventually get the A1c down around 7 or below    She told me that she is experiencing some diarrhea now that she is restarted metformin.  I told her to stick with it, her system will hopefully adapt to the metformin.    She is also started the lisinopril for microalbuminuria.  We want to keep her blood pressures around 110/70.    In the past she was on glipizide, working to hold off on that for now.  Glipizide can make it harder to lose weight.    She is way overdue for diabetic eye exam.  She plans to get her eye exam done by an optometrist at [facility].      She picked up her testing supplies, and is going to get into the habit of self monitoring twice a day.     Hyperlipidemia, restarted on atorvastatin,  goal of LDL cholesterol less than 70.      Cigarette smoking, which definitely needs to stop in light of her diabetes.  She smokes approximately about a pack a day, and at that level of consumption, she may have a degree of physiologic nicotine dependence.     Today I prescribed for her nicotine gum at the stronger 4 mg strength.  Given her mental health conditions, I would not use Chantix.  If we do not use nicotine or Chantix, I think she is going to have to do this the old-fashioned way.     Obesity with complications of diabetes and hyperlipidemia, needs  to eventually lose about 50 pounds or more.  She estimates that her weight is actually dropped by about 30 pounds over the last year.  That could be a good thing if that was done on purpose.  However it could also be a sign of poorly controlled diabetes.     Anxiety, depression, insomnia, which are under reasonably good control now on a rather complex medication regimen, currently working with [facility] nurse practitioner Tito Purdy.  Her medication regimen is a rather complex when the consistent for drugs that are managed by Young Rajwinder.  These are aripiprazole, duloxetine, gabapentin, and mirtazapine.  She told me that the combination is working well.  She does not think that any of these medications contributed to weight gain.     Chronic low back pain, for which she has been working with [facility] and was told about lumbar spinal stenosis.  We have not received any documents from [facility], and it would be useful to see the results of their imaging studies, which she recalls includes a lumbar spine MRI.  She was told that she has noncritical lumbar spinal stenosis.  She did start physical therapy at [facility], and I told her that she needs to do the home exercises, which she is currently not doing.  I told her that the leg pain could be from lumbar spinal stenosis, or radicular symptoms (nerve root pain from bad lumbar disks).  But it could also represent diabetic neuropathy.  I asked her to ask [facility] to send us their documents, even better yet she should get her own copy, and bring them here.    Chronic left knee pain, probably degenerative arthritis.  Her left knee grinds.  We will get a get a left knee x-ray, and I would expect to see some degenerative changes.    I encouraged her to start isometric exercise for her back and her knee.  I demonstrated a few of the maneuvers here in the exam room.     Needs routine GYN care, ordered  Immunization History    Administered Date(s) Administered     Influenza, seasonal,quad inj 6-35 mos 10/02/2014     Influenza,seasonal quad, PF, =/> 6months 02/13/2020     Td,adult,historic,unspecified 05/15/2012     Tdap 05/15/2012            History of Present Illness   This 44 y.o. old woman comes to clinic accompanied by her best friend, for follow-up of the medical issues identified at our initial meeting on February 13, 2020.  This is an all counseling visit.    Symptomatically stable, has restarted on medications for type 2 diabetes, hypertension, and hyperlipidemia, since our initial visit on February 13.    Type 2 diabetes, possible neuropathy complications with pain in her legs and numbness in her fingers; microalbuminuria demonstrated February 13, 2020.     Reestablishing primary care at [facility] internal medicine, with a focus on diabetes and metabolic conditions, for this 44-year-old woman, whose  is also my patient, and who works as a Internet telecommuter.  Issues are type 2 diabetes, has been off of her metformin and glipizide for about a year and a half, and I am concerned that she may be experiencing neuropathy complications with pain in her legs and numbness in her fingers.  Hyperlipidemia, was on atorvastatin, has been off of that for about a year and a half.  Cigarette smoking, which definitely needs to stop in light of her diabetes.  Obesity with complications of diabetes and hyperlipidemia, needs to eventually lose about 50 pounds or more.  Anxiety, depression, insomnia, which are under reasonably good control now on a rather complex medication regimen, currently working with [facility] nurse practitioner Tito Purdy.  Chronic low back pain, for which she has been working with [facility] and was told about lumbar spinal stenosis that is noncritical.  Needs routine GYN care.    Wt Readings from Last 3 Encounters:   02/20/20 199 lb 4.8 oz (90.4 kg)   02/13/20 202 lb 11.2 oz  (91.9 kg)   10/24/17 214 lb (97.1 kg)     BP Readings from Last 3 Encounters:   02/20/20 120/80   02/13/20 125/79   10/24/17 123/78       Lab Results   Component Value Date    WBC 11.7 (H) 02/13/2020    HGB 15.7 02/13/2020    HCT 48.2 (H) 02/13/2020     02/13/2020    CHOL 208 (H) 02/13/2020    TRIG 229 (H) 02/13/2020    HDL 40 (L) 02/13/2020    ALT 28 02/13/2020    AST 15 02/13/2020     02/13/2020    K 4.3 02/13/2020     02/13/2020    CREATININE 0.70 02/13/2020    BUN 11 02/13/2020    CO2 26 02/13/2020    TSH 2.07 02/13/2020    HGBA1C 9.8 (H) 02/13/2020    MICROALBUR 2.67 (H) 02/13/2020             Medications, Allergies, Social, and Problem List   Current Outpatient Medications   Medication Sig Dispense Refill     ARIPiprazole (ABILIFY) 10 MG tablet Take 10 mg by mouth daily.        atorvastatin (LIPITOR) 20 MG tablet Take 1 tablet (20 mg total) by mouth daily. 90 tablet 3     blood glucose meter (GLUCOMETER) Test TWICE daily. Dispense glucometer brand per patient's insurance at pharmacy discretion. 1 each 0     blood glucose test strips Test TWICE daily. Dispense brand per patient's insurance at pharmacy discretion. 100 strip 11     DULoxetine (CYMBALTA) 30 MG capsule TAKE 1 CAPSULE DAILY FOR A TOTAL DAILY DOSE OF 90 MG       DULoxetine (CYMBALTA) 60 MG capsule Take by mouth daily.       gabapentin (NEURONTIN) 300 MG capsule 600 mg 2 (two) times a day.  2     generic lancets (FINGERSTIX LANCETS) Test TWICE daily. Dispense brand per patient's insurance at pharmacy discretion. 100 each 11     lisinopril (PRINIVIL,ZESTRIL) 10 MG tablet Take 1 tablet (10 mg total) by mouth daily. 90 tablet 3     metFORMIN (GLUCOPHAGE) 1000 MG tablet Take 1 tablet (1,000 mg total) by mouth 2 (two) times a day with meals. 180 tablet 3     mirtazapine (REMERON) 15 MG tablet TAKE 1 TABLET BY MOUTH EVERYDAY AT BEDTIME       No current facility-administered medications for this visit.      No Known Allergies  Social  History     Tobacco Use     Smoking status: Current Every Day Smoker     Packs/day: 1.00     Years: 20.00     Pack years: 20.00     Types: Cigarettes     Smokeless tobacco: Never Used   Substance Use Topics     Alcohol use: Not on file     Drug use: Not on file     Patient Active Problem List   Diagnosis     Type 2 diabetes mellitus (H)     Anxiety     Insomnia     Hyperlipidemia     Tobacco abuse     Obesity     Microalbuminuria due to type 2 diabetes mellitus (H)        Reviewed, reconciled and updated       Physical Exam   General Appearance: Appears well, still has smoker's cough and wheeze    /80 (Patient Site: Right Arm, Patient Position: Sitting, Cuff Size: Adult Large)   Pulse (!) 120   Temp 98.4  F (36.9  C) (Oral)   Wt 199 lb 4.8 oz (90.4 kg)   SpO2 98%   BMI 36.45 kg/m           Additional Information   I spent 25 minutes face time with the patient, with > 50% counseling, explaining and discussing with the patient the issues enumerated in the Assessment and Plan section of this note and answering questions. Afterwards, the patient was given a printout of the AVS, with attention to the content in the Patient Instruction section.       Umer Vega MD

## 2021-06-06 NOTE — PATIENT INSTRUCTIONS - HE
Symptomatically stable, has restarted on medications for type 2 diabetes, hypertension, and hyperlipidemia, since our initial visit on February 13.    Type 2 diabetes, possible neuropathy complications with pain in her legs and numbness in her fingers; microalbuminuria demonstrated February 13, 2020    Her A1c test turned out expectedly elevated, but not too terrible.  Lab Results   Component Value Date    HGBA1C 9.8 (H) 02/13/2020   Goal is to eventually get the A1c down around 7 or below    She told me that she is experiencing some diarrhea now that she is restarted metformin.  I told her to stick with it, her system will hopefully adapt to the metformin.    She is also started the lisinopril for microalbuminuria.  We want to keep her blood pressures around 110/70.    In the past she was on glipizide, working to hold off on that for now.  Glipizide can make it harder to lose weight.    She is way overdue for diabetic eye exam.  She plans to get her eye exam done by an optometrist at hospitals.      She picked up her testing supplies, and is going to get into the habit of self monitoring twice a day.     Hyperlipidemia, restarted on atorvastatin,  goal of LDL cholesterol less than 70.      Cigarette smoking, which definitely needs to stop in light of her diabetes.  She smokes approximately about a pack a day, and at that level of consumption, she may have a degree of physiologic nicotine dependence.     Today I prescribed for her nicotine gum at the stronger 4 mg strength.  Given her mental health conditions, I would not use Chantix.  If we do not use nicotine or Chantix, I think she is going to have to do this the old-fashioned way.     Obesity with complications of diabetes and hyperlipidemia, needs to eventually lose about 50 pounds or more.  She estimates that her weight is actually dropped by about 30 pounds over the last year.  That could be a good thing if that was done on purpose.  However it could also  be a sign of poorly controlled diabetes.     Anxiety, depression, insomnia, which are under reasonably good control now on a rather complex medication regimen, currently working with [facility] nurse practitioner Tito Purdy.  Her medication regimen is a rather complex when the consistent for drugs that are managed by Young Rajwinder.  These are aripiprazole, duloxetine, gabapentin, and mirtazapine.  She told me that the combination is working well.  She does not think that any of these medications contributed to weight gain.     Chronic low back pain, for which she has been working with [facility] and was told about lumbar spinal stenosis.  We have not received any documents from [facility], and it would be useful to see the results of their imaging studies, which she recalls includes a lumbar spine MRI.  She was told that she has noncritical lumbar spinal stenosis.  She did start physical therapy at [facility], and I told her that she needs to do the home exercises, which she is currently not doing.  I told her that the leg pain could be from lumbar spinal stenosis, or radicular symptoms (nerve root pain from bad lumbar disks).  But it could also represent diabetic neuropathy.  I asked her to ask [facility] to send us their documents, even better yet she should get her own copy, and bring them here.    Chronic left knee pain, probably degenerative arthritis.  Her left knee grinds.  We will get a get a left knee x-ray, and I would expect to see some degenerative changes.    I encouraged her to start isometric exercise for her back and her knee.  I demonstrated a few of the maneuvers here in the exam room.     Needs routine GYN care, ordered  Immunization History   Administered Date(s) Administered     Influenza, seasonal,quad inj 6-35 mos 10/02/2014     Influenza,seasonal quad, PF, =/> 6months 02/13/2020     Td,adult,historic,unspecified 05/15/2012     Tdap 05/15/2012

## 2021-06-06 NOTE — PATIENT INSTRUCTIONS - HE
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  Issues are type 2 diabetes, has been off of her metformin and glipizide for about a year and a half, and I am concerned that she may be experiencing neuropathy complications with pain in her legs and numbness in her fingers.  Hyperlipidemia, was on atorvastatin, has been off of that for about a year and a half.  Cigarette smoking, which definitely needs to stop in light of her diabetes.  Obesity with complications of diabetes and hyperlipidemia, needs to eventually lose about 50 pounds or more.  Anxiety, depression, insomnia, which are under reasonably good control now on a rather complex medication regimen, currently working with [facility] nurse practitioner Tito Purdy.  Chronic low back pain, for which she has been working with [facility] and was told about lumbar spinal stenosis that is noncritical.  Needs routine GYN care.    She is fasting this morning, so we will get comprehensive metabolic panel, lipid profile, A1c, blood cell counts, thyroid cascade, uric acid, urine microalbumin, and vitamin D level.  Consultation is requested for ophthalmology for routine diabetic eye exam, and also gynecology for routine care.  Diabetic nurse educator    Going issue by issue:    Type 2 diabetes, has been off of her metformin and glipizide for about a year and a half, and I am concerned that she may be experiencing neuropathy complications with pain in her legs and numbness in her fingers.  She told me that the metformin caused a lot of diarrhea, and she still has some ongoing diarrhea.  I told her that diarrhea can also be a complication of poorly controlled diabetes.  Obviously we will check her metabolic status including A1c test.  Goal is an A1c less than 7.5, preferably less than 7.  I feel confident that I  will ask her to try to start metformin again.  She is going to need medication on top of the metformin in all likelihood, and that could be a GLP-1 inhibitor such as Trulicity injected once a week, or Victoza injected daily.  She is way overdue for diabetic eye exam, so I have ordered that for her.  Also order diabetes nurse educator, so that she can get a re-grounding in healthy lifestyle habits and self-monitoring procedures.  I put in an order for her to get a new glucometer and testing supplies.  I want her to test twice a day, before breakfast every day, and then take readings at other times of the day so that we can see her pattern.    Hyperlipidemia, was on atorvastatin, has been off of that for about a year and a half.  In the context of diabetes, she definitely needs to be on a statin, with a goal of LDL cholesterol less than 70.    Cigarette smoking, which definitely needs to stop in light of her diabetes.  She smokes approximately about a pack a day, and at that level of consumption, she may have a degree of physiologic nicotine dependence.  I would offer for her nicotine patches and gum.  She is shaking her head.  Given her mental health conditions, I would not use Chantix.  If we do not use nicotine or Chantix, I think she is going to have to do this the old-fashioned way.    Obesity with complications of diabetes and hyperlipidemia, needs to eventually lose about 50 pounds or more.  She estimates that her weight is actually dropped by about 30 pounds over the last year.  That could be a good thing if that was done on purpose.  However it could also be a sign of poorly controlled diabetes.    Anxiety, depression, insomnia, which are under reasonably good control now on a rather complex medication regimen, currently working with [facility] nurse practitioner Tito Purdy.  Her medication regimen is a rather complex when the consistent for drugs that are managed by Mr. Purdy.  These are  aripiprazole, duloxetine, gabapentin, and mirtazapine.  She told me that the combination is working well.  She does not think that any of these medications contributed to weight gain.    Chronic low back pain, for which she has been working with [facility] and was told about lumbar spinal stenosis.  We have not received any documents from [facility], and it would be useful to see the results of their imaging studies, which she recalls includes a lumbar spine MRI.  She was told that she has noncritical lumbar spinal stenosis.  She did start physical therapy at [facility], and I told her that she needs to do the home exercises, which she is currently not doing.  I told her that the leg pain could be from lumbar spinal stenosis, or radicular symptoms (nerve root pain from bad lumbar disks).  But it could also represent diabetic neuropathy.  I asked her to ask [facility] to send us their documents, even better yet she should get her own copy, and bring them here.    Needs routine GYN care, ordered    She is going to get a influenza vaccine today.    I like to see her back next week, so we can continue to build out the plan.

## 2021-06-08 NOTE — PROGRESS NOTES
Office Visit - Follow Up   Elissa Grajeda   44 y.o. female    Date of Visit: 5/27/2020    Chief Complaint   Patient presents with     Follow-up        Assessment and Plan     Type 2 diabetes, possible neuropathy complications with pain in her legs and numbness in her fingers; microalbuminuria demonstrated February 13, 2020.      She reports getting home blood sugar readings of around 200.    Today we will recheck an A1c level.  I consider her A1c goal to be 7 or less.  She confirmed that she is taking metformin at the full dose of 1000 mg twice a day.    I told her that if we need to escalate the diabetes medication, the next up would be to add a once a week injectable such as Trulicity or Ozempic.  Either of those medications might help her also lose a few pounds.  Lab Results   Component Value Date    HGBA1C 9.8 (H) 02/13/2020     For microalbuminuria, I am also escalating her lisinopril to 20 mg a day.     She recalls undergoing an eye exam in February 2020, which would be good for a year.  They did examine her retina and told her that it looked okay       Hyperlipidemia, restarted on atorvastatin,  goal of LDL cholesterol less than 70.      Cigarette smoking, which definitely needs to stop in light of her diabetes.  She smokes approximately about a pack a day, and at that level of consumption, she may have a degree of physiologic nicotine dependence.      Today I prescribed for her nicotine gum at the stronger 4 mg strength.  Given her mental health conditions, I would not use Chantix.  If we do not use nicotine or Chantix, I think she is going to have to do this the old-fashioned way.     Obesity with complications of diabetes and hyperlipidemia, needs to eventually lose about 50 pounds or more.  She estimates that her weight is actually dropped by about 30 pounds over the last year.   Wt Readings from Last 3 Encounters:   05/27/20 206 lb 1.6 oz (93.5 kg)   03/10/20 205 lb 3.2 oz (93.1 kg)   02/20/20 199 lb  4.8 oz (90.4 kg)        Anxiety, depression, insomnia, which are under reasonably good control now on a rather complex medication regimen, currently working with [facility] nurse practitioner Tito Purdy.  Her medication regimen is a rather complex when the consistent for drugs that are managed by Mr. Purdy.  These are aripiprazole, duloxetine, gabapentin, and mirtazapine.  Recently Mr. Purdy added Cogentin 0.5 mg at bedtime to her regimen, which she finds quite helpful.  She told me that the combination is working well.  She does not think that any of these medications contributed to weight gain.     Chronic low back pain, for which she was been working with [facility] and was told about lumbar spinal stenosis.  We have not received any documents from [facility], and it would be useful to see the results of their imaging studies, which she recalls includes a lumbar spine MRI.  She was told that she has noncritical lumbar spinal stenosis.  She did start physical therapy at [facility], and I told her that she needs to do the home exercises, which she is currently not doing.    I told her that the leg pain could be from lumbar spinal stenosis, or radicular symptoms (nerve root pain from bad lumbar disks).  But it could also represent diabetic neuropathy.  I asked her to ask [facility] to send us their documents, even better yet she should get her own copy, and bring them here.     Chronic left knee pain, probably degenerative arthritis.    Left knee x-ray performed February 20, 2020 confirmed moderate primary osteoarthritis in all 3 compartments also a tiny effusion, but no fracture     I encouraged her to start isometric exercise for her back and her knee.  I demonstrated a few of the maneuvers here in the exam room.     Needs routine GYN care  I ordered that consultation in February, she has the phone number to call.    I will post her test results are online chart,  and we can prescribe the Trulicity or Ozempic electronically.  I like to see her back in 3 months.       History of Present Illness   This 44 y.o. old woman comes to clinic accompanied by her  (who is also my patient) for follow-up of type 2 diabetes, smoking, microalbuminuria, elevated blood pressure    Previous visit with me was 2-20-20  Unfortunately still smoking about a pack a day.  Not ready to stop yet.    Getting home blood sugar readings of around 200.    Her psychiatric nurse practitioner added Geodon to her regimen, taken at bedtime, which he does find helpful to relieve anxiety symptoms.    Wt Readings from Last 3 Encounters:   05/27/20 206 lb 1.6 oz (93.5 kg)   03/10/20 205 lb 3.2 oz (93.1 kg)   02/20/20 199 lb 4.8 oz (90.4 kg)     BP Readings from Last 3 Encounters:   05/27/20 132/88   03/10/20 130/85   02/20/20 120/80       Lab Results   Component Value Date    WBC 11.7 (H) 02/13/2020    HGB 15.7 02/13/2020    HCT 48.2 (H) 02/13/2020     02/13/2020    CHOL 208 (H) 02/13/2020    TRIG 229 (H) 02/13/2020    HDL 40 (L) 02/13/2020    ALT 28 02/13/2020    AST 15 02/13/2020     02/13/2020    K 4.3 02/13/2020     02/13/2020    CREATININE 0.70 02/13/2020    BUN 11 02/13/2020    CO2 26 02/13/2020    TSH 2.07 02/13/2020    HGBA1C 9.8 (H) 02/13/2020    MICROALBUR 2.67 (H) 02/13/2020           Medications, Allergies, Social, and Problem List   Current Outpatient Medications   Medication Sig Dispense Refill     ARIPiprazole (ABILIFY) 10 MG tablet Take 10 mg by mouth daily.        atorvastatin (LIPITOR) 20 MG tablet Take 1 tablet (20 mg total) by mouth daily. 90 tablet 3     benztropine (COGENTIN) 0.5 MG tablet TAKE 1 TABLET BY MOUTH EVERYDAY AT BEDTIME       blood glucose meter (GLUCOMETER) Test TWICE daily. Dispense glucometer brand per patient's insurance at pharmacy discretion. 1 each 0     blood glucose test strips Test TWICE daily. Dispense brand per patient's insurance at pharmacy  discretion. 100 strip 11     DULoxetine (CYMBALTA) 60 MG capsule Take 120 mg by mouth daily.        gabapentin (NEURONTIN) 300 MG capsule 600 mg 2 (two) times a day.  2     generic lancets (FINGERSTIX LANCETS) Test TWICE daily. Dispense brand per patient's insurance at pharmacy discretion. 100 each 11     lisinopril (PRINIVIL,ZESTRIL) 10 MG tablet Take 1 tablet (10 mg total) by mouth daily. 90 tablet 3     metFORMIN (GLUCOPHAGE) 1000 MG tablet Take 1 tablet (1,000 mg total) by mouth 2 (two) times a day with meals. 180 tablet 3     mirtazapine (REMERON) 15 MG tablet TAKE 1 TABLET BY MOUTH EVERYDAY AT BEDTIME       DULoxetine (CYMBALTA) 30 MG capsule TAKE 1 CAPSULE DAILY FOR A TOTAL DAILY DOSE OF 90 MG       nicotine polacrilex (NICORETTE) 4 MG gum Apply 1 each (4 mg total) to the mouth or throat as needed for smoking cessation.  0     No current facility-administered medications for this visit.      No Known Allergies  Social History     Tobacco Use     Smoking status: Current Every Day Smoker     Packs/day: 1.00     Years: 20.00     Pack years: 20.00     Types: Cigarettes     Smokeless tobacco: Never Used   Substance Use Topics     Alcohol use: Not on file     Drug use: Not on file     Patient Active Problem List   Diagnosis     Type 2 diabetes mellitus (H)     Anxiety     Insomnia     Hyperlipidemia     Tobacco abuse     Obesity     Microalbuminuria due to type 2 diabetes mellitus (H)        Reviewed, reconciled and updated       Physical Exam   General Appearance: Pleasant, normal mental status, appropriate affect, significantly overweight, breathing comfortably.    /88 (Patient Site: Right Arm, Patient Position: Sitting, Cuff Size: Adult Regular)   Pulse 94   Temp 98.6  F (37  C) (Oral)   Wt 206 lb 1.6 oz (93.5 kg)   SpO2 96%   BMI 37.39 kg/m         Additional Information   I spent 25 minutes face time with the patient, with > 50% counseling, explaining and discussing with the patient the issues  enumerated in the Assessment and Plan section of this note and answering questions. Afterwards, the patient was given a printout of the AVS, with attention to the content in the Patient Instruction section.       Umer Vega MD

## 2021-06-08 NOTE — PATIENT INSTRUCTIONS - HE
Type 2 diabetes, possible neuropathy complications with pain in her legs and numbness in her fingers; microalbuminuria demonstrated February 13, 2020.      She reports getting home blood sugar readings of around 200.    Today we will recheck an A1c level.  I consider her A1c goal to be 7 or less.  She confirmed that she is taking metformin at the full dose of 1000 mg twice a day.    I told her that if we need to escalate the diabetes medication, the next up would be to add a once a week injectable such as Trulicity or Ozempic.  Either of those medications might help her also lose a few pounds.  Lab Results   Component Value Date    HGBA1C 9.8 (H) 02/13/2020     For microalbuminuria, I am also escalating her lisinopril to 20 mg a day.     She recalls undergoing an eye exam in February 2020, which would be good for a year.  They did examine her retina and told her that it looked okay       Hyperlipidemia, restarted on atorvastatin,  goal of LDL cholesterol less than 70.      Cigarette smoking, which definitely needs to stop in light of her diabetes.  She smokes approximately about a pack a day, and at that level of consumption, she may have a degree of physiologic nicotine dependence.      Today I prescribed for her nicotine gum at the stronger 4 mg strength.  Given her mental health conditions, I would not use Chantix.  If we do not use nicotine or Chantix, I think she is going to have to do this the old-fashioned way.     Obesity with complications of diabetes and hyperlipidemia, needs to eventually lose about 50 pounds or more.  She estimates that her weight is actually dropped by about 30 pounds over the last year.   Wt Readings from Last 3 Encounters:   05/27/20 206 lb 1.6 oz (93.5 kg)   03/10/20 205 lb 3.2 oz (93.1 kg)   02/20/20 199 lb 4.8 oz (90.4 kg)        Anxiety, depression, insomnia, which are under reasonably good control now on a rather complex medication regimen, currently working with [facility] nurse practitioner Tito Purdy.  Her medication regimen is a rather complex when the consistent for drugs that are managed by Mr. Purdy.  These are aripiprazole, duloxetine, gabapentin, and mirtazapine.  Recently Mr. Purdy added Cogentin 0.5 mg at bedtime to her regimen, which she finds quite helpful.  She told me that the combination is working well.  She does not think that any of these medications contributed to weight gain.     Chronic low back pain, for which she was been working with [facility] and was told about lumbar spinal stenosis.  We have not received any documents from [facility], and it would be useful to see the results of their imaging studies, which she recalls includes a lumbar spine MRI.  She was told that she has noncritical lumbar spinal stenosis.  She did start physical therapy at [facility], and I told her that she needs to do the home exercises, which she is currently not doing.    I told her that the leg pain could be from lumbar spinal stenosis, or radicular symptoms (nerve root pain from bad lumbar disks).  But it could also represent diabetic neuropathy.  I asked her to ask [facility] to send us their documents, even better yet she should get her own copy, and bring them here.     Chronic left knee pain, probably degenerative arthritis.    Left knee x-ray performed February 20, 2020 confirmed moderate primary osteoarthritis in all 3 compartments also a tiny effusion, but no fracture     I encouraged her to start isometric exercise for her back and her knee.  I demonstrated a few of the maneuvers here in the exam room.     Needs routine GYN care  I ordered that consultation in February, she has the phone number to call.    I will post her test results are online chart, and we can prescribe the Trulicity or Ozempic electronically.  I like to see her back in 3 months.

## 2021-06-10 NOTE — PATIENT INSTRUCTIONS - HE
Type 2 diabetes, possible neuropathy complications with pain in her legs and numbness in her fingers; microalbuminuria demonstrated February 13, 2020.  A1c improved a little bit, but still nowhere near goal, will increase Trulicity up to 1.5 mg/week    Lab Results   Component Value Date    HGBA1C 9.3 (H) 08/27/2020     Trullicity  Inject 0.75 mg under the skin every 7 days.        Ordered on: 5/29/2020        Wt Readings from Last 3 Encounters:   08/27/20 206 lb 8 oz (93.7 kg)   05/27/20 206 lb 1.6 oz (93.5 kg)   03/10/20 205 lb 3.2 oz (93.1 kg)      She reports getting home blood sugar readings of around 130-140, better than the 200 in May     I consider her A1c goal to be 7 or less.  She confirmed that she is taking metformin at the full dose of 1000 mg twice a day.    For microalbuminuria, she is on lisinopril 10 mg a day    She recalls undergoing an eye exam in February 2020, which would be good for a year.  They did examine her retina and told her that it looked okay       Hyperlipidemia, restarted on atorvastatin,  goal of LDL cholesterol less than 70.      Cigarette smoking, which definitely needs to stop in light of her diabetes.  She smokes approximately about a pack a day, and at that level of consumption, she may have a degree of physiologic nicotine dependence.      Today I prescribed for her nicotine gum at the stronger 4 mg strength.  Given her mental health conditions, I would not use Chantix.  If we do not use nicotine or Chantix, I think she is going to have to do this the old-fashioned way.     Obesity with complications of diabetes and hyperlipidemia, needs to eventually lose about 50 pounds or more.  She estimates that her weight is actually dropped by about 30 pounds over the last year.   Wt Readings from Last 3 Encounters:   08/27/20 206 lb 8 oz (93.7 kg)   05/27/20 206 lb 1.6 oz (93.5 kg)   03/10/20 205 lb 3.2 oz (93.1 kg)     Anxiety, depression, insomnia, which are under reasonably good  control now on a rather complex medication regimen, currently working with [facility] nurse practitioner Tito Purdy.    Her medication regimen is a rather complex when the consistent for drugs that are managed by Mr. Purdy.  These are aripiprazole, duloxetine, gabapentin, and mirtazapine.  Recently Mr. Purdy added Cogentin 0.5 mg at bedtime to her regimen, which she finds quite helpful.  She told me that the combination is working well.  She does not think that any of these medications contributed to weight gain.     Chronic low back pain, for which she was been working with [facility] and was told about lumbar spinal stenosis.  We have not received any documents from [facility], and it would be useful to see the results of their imaging studies, which she recalls includes a lumbar spine MRI.  She was told that she has noncritical lumbar spinal stenosis.  She did start physical therapy at [facility], and I told her that she needs to do the home exercises, which she is currently not doing.     I told her that the leg pain could be from lumbar spinal stenosis, or radicular symptoms (nerve root pain from bad lumbar disks).  But it could also represent diabetic neuropathy.  I asked her to ask [facility] to send us their documents, even better yet she should get her own copy, and bring them here.     Chronic left knee pain, probably degenerative arthritis.    Left knee x-ray performed February 20, 2020 confirmed moderate primary osteoarthritis in all 3 compartments also a tiny effusion, but no fracture     I encouraged her to start isometric exercise for her back and her knee.  I demonstrated a few of the maneuvers here in the exam room.    Needs routine GYN care  I ordered that consultation in February, she has the phone number to call.    I like to see her back in 3 months.

## 2021-06-10 NOTE — PROGRESS NOTES
Office Visit - Follow Up   Elissa Grajeda   44 y.o. female    Date of Visit: 8/27/2020    Chief Complaint   Patient presents with     Follow-up     Knee Pain        -------------------------------------------------------------------------------------------------------------------------  Assessment and Plan    Type 2 diabetes, possible neuropathy complications with pain in her legs and numbness in her fingers; microalbuminuria demonstrated February 13, 2020.  A1c improved a little bit, but still nowhere near goal, will increase Trulicity up to 1.5 mg/week    Lab Results   Component Value Date    HGBA1C 9.3 (H) 08/27/2020     Trullicity  Inject 0.75 mg under the skin every 7 days.        Ordered on: 5/29/2020        Wt Readings from Last 3 Encounters:   08/27/20 206 lb 8 oz (93.7 kg)   05/27/20 206 lb 1.6 oz (93.5 kg)   03/10/20 205 lb 3.2 oz (93.1 kg)      She reports getting home blood sugar readings of around 130-140, better than the 200 in May     I consider her A1c goal to be 7 or less.  She confirmed that she is taking metformin at the full dose of 1000 mg twice a day.    For microalbuminuria, she is on lisinopril 10 mg a day    She recalls undergoing an eye exam in February 2020, which would be good for a year.  They did examine her retina and told her that it looked okay       Hyperlipidemia, restarted on atorvastatin,  goal of LDL cholesterol less than 70.      Cigarette smoking, which definitely needs to stop in light of her diabetes.  She smokes approximately about a pack a day, and at that level of consumption, she may have a degree of physiologic nicotine dependence.      Today I prescribed for her nicotine gum at the stronger 4 mg strength.  Given her mental health conditions, I would not use Chantix.  If we do not use nicotine or Chantix, I think she is going to have to do this the old-fashioned way.     Obesity with complications of diabetes and hyperlipidemia, needs to eventually lose about 50  pounds or more.  She estimates that her weight is actually dropped by about 30 pounds over the last year.   Wt Readings from Last 3 Encounters:   08/27/20 206 lb 8 oz (93.7 kg)   05/27/20 206 lb 1.6 oz (93.5 kg)   03/10/20 205 lb 3.2 oz (93.1 kg)     Anxiety, depression, insomnia, which are under reasonably good control now on a rather complex medication regimen, currently working with [facility] nurse practitioner Tito Purdy.    Her medication regimen is a rather complex when the consistent for drugs that are managed by Mr. Purdy.  These are aripiprazole, duloxetine, gabapentin, and mirtazapine.  Recently Mr. Purdy added Cogentin 0.5 mg at bedtime to her regimen, which she finds quite helpful.  She told me that the combination is working well.  She does not think that any of these medications contributed to weight gain.     Chronic low back pain, for which she was been working with [facility] and was told about lumbar spinal stenosis.  We have not received any documents from [facility], and it would be useful to see the results of their imaging studies, which she recalls includes a lumbar spine MRI.  She was told that she has noncritical lumbar spinal stenosis.  She did start physical therapy at [facility], and I told her that she needs to do the home exercises, which she is currently not doing.     I told her that the leg pain could be from lumbar spinal stenosis, or radicular symptoms (nerve root pain from bad lumbar disks).  But it could also represent diabetic neuropathy.  I asked her to ask [facility] to send us their documents, even better yet she should get her own copy, and bring them here.     Chronic left knee pain, probably degenerative arthritis.    Left knee x-ray performed February 20, 2020 confirmed moderate primary osteoarthritis in all 3 compartments also a tiny effusion, but no fracture     I encouraged her to start isometric exercise for her back  and her knee.  I demonstrated a few of the maneuvers here in the exam room.    Needs routine GYN care  I ordered that consultation in February, she has the phone number to call.    I like to see her back in 3 months.    --------------------------------------------------------------------------------------------------------------------------  History of Present Illness  This 44 y.o. old woman comes to clinic accompanied by her , recheck diabetes and other medical issues.    Has not yet lost weight.  Still smoking.  Reports blood sugars are little bit better since starting on Trulicity 0.75 mg/week.    She reports getting home blood sugar readings of around 130-140, better than the 200 in May     I consider her A1c goal to be 7 or less.  She confirmed that she is taking metformin at the full dose of 1000 mg twice a day.    For microalbuminuria, she is on lisinopril 10 mg a day    She recalls undergoing an eye exam in February 2020, which would be good for a year.  They did examine her retina and told her that it looked okay    Wt Readings from Last 3 Encounters:   08/27/20 206 lb 8 oz (93.7 kg)   05/27/20 206 lb 1.6 oz (93.5 kg)   03/10/20 205 lb 3.2 oz (93.1 kg)     BP Readings from Last 3 Encounters:   08/27/20 90/62   05/27/20 132/88   03/10/20 130/85       Lab Results   Component Value Date    WBC 11.7 (H) 02/13/2020    HGB 15.7 02/13/2020    HCT 48.2 (H) 02/13/2020     02/13/2020    CHOL 208 (H) 02/13/2020    TRIG 229 (H) 02/13/2020    HDL 40 (L) 02/13/2020    ALT 28 02/13/2020    AST 15 02/13/2020     (L) 05/27/2020    K 4.9 05/27/2020     05/27/2020    CREATININE 0.72 05/27/2020    BUN 14 05/27/2020    CO2 21 (L) 05/27/2020    TSH 2.07 02/13/2020    HGBA1C 9.7 (H) 05/27/2020    MICROALBUR 2.67 (H) 02/13/2020        ---------------------------------------------------------------------------------------------------------------------------    Medications, Allergies, Social, and Problem  List   Current Outpatient Medications   Medication Sig Dispense Refill     ARIPiprazole (ABILIFY) 10 MG tablet Take 10 mg by mouth daily.        atorvastatin (LIPITOR) 20 MG tablet Take 1 tablet (20 mg total) by mouth daily. 90 tablet 3     benztropine (COGENTIN) 0.5 MG tablet TAKE 1 TABLET BY MOUTH EVERYDAY AT BEDTIME       blood glucose meter (GLUCOMETER) Test TWICE daily. Dispense glucometer brand per patient's insurance at pharmacy discretion. 1 each 0     blood glucose test strips Test TWICE daily. Dispense brand per patient's insurance at pharmacy discretion. 100 strip 11     dulaglutide (TRULICITY) 0.75 mg/0.5 mL PnIj Inject 0.75 mg under the skin every 7 days. 4 Syringe 11     DULoxetine (CYMBALTA) 60 MG capsule Take 120 mg by mouth daily.        gabapentin (NEURONTIN) 300 MG capsule 600 mg 2 (two) times a day.  2     generic lancets (FINGERSTIX LANCETS) Test TWICE daily. Dispense brand per patient's insurance at pharmacy discretion. 100 each 11     lisinopriL (PRINIVIL,ZESTRIL) 10 MG tablet Take 10 mg by mouth daily.       metFORMIN (GLUCOPHAGE) 1000 MG tablet Take 1 tablet (1,000 mg total) by mouth 2 (two) times a day with meals. 180 tablet 3     mirtazapine (REMERON) 15 MG tablet TAKE 1 TABLET BY MOUTH EVERYDAY AT BEDTIME       lisinopriL (PRINIVIL,ZESTRIL) 20 MG tablet Take 0.5 tablets (10 mg total) by mouth daily. 90 tablet 3     nicotine polacrilex (NICORETTE) 4 MG gum Apply 1 each (4 mg total) to the mouth or throat as needed for smoking cessation.  0     No current facility-administered medications for this visit.      No Known Allergies  Social History     Tobacco Use     Smoking status: Current Every Day Smoker     Packs/day: 1.00     Years: 20.00     Pack years: 20.00     Types: Cigarettes     Smokeless tobacco: Never Used   Substance Use Topics     Alcohol use: Not on file     Drug use: Not on file     Patient Active Problem List   Diagnosis     Type 2 diabetes mellitus (H)     Anxiety      Insomnia     Hyperlipidemia     Tobacco abuse     Obesity     Microalbuminuria due to type 2 diabetes mellitus (H)     Benign essential hypertension     Spinal stenosis, lumbar region, with neurogenic claudication     Osteoarthritis of left knee, unspecified osteoarthritis type-- xray Feb 2020        Reviewed, reconciled and updated       Physical Exam   General Appearance:   Pleasant, appears well, breathing comfortably, not coughing    BP 90/62 (Patient Site: Right Arm, Patient Position: Sitting, Cuff Size: Adult Large)   Wt 206 lb 8 oz (93.7 kg)   BMI 37.47 kg/m           Additional Information   I spent 15 minutes face time with the patient, with > 50% counseling, explaining and discussing with the patient the issues enumerated in the Assessment and Plan section of this note and answering questions. Afterwards, the patient was given a printout of the AVS, with attention to the content in the Patient Instruction section.       Umer Vega MD

## 2021-06-15 NOTE — TELEPHONE ENCOUNTER
RN cannot approve Refill Request    RN can NOT refill this medication PCP messaged that patient is overdue for Labs and Office Visit and Protocol failed and NO refill given. Last office visit: 8/27/2020 Umer Vega MD Last Physical: Visit date not found Last MTM visit: Visit date not found Last visit same specialty: 8/27/2020 Umer Vega MD.  Next visit within 3 mo: Visit date not found  Next physical within 3 mo: Visit date not found      Lakshmi Brannon, [facility] Triage/Med Refill 3/12/2021    Requested Prescriptions   Pending Prescriptions Disp Refills     lisinopriL (PRINIVIL,ZESTRIL) 10 MG tablet [Pharmacy Med Name: LISINOPRIL 10 MG TABLET] 90 tablet 3     Sig: TAKE 1 TABLET BY MOUTH EVERY DAY       Ace Inhibitors Refill Protocol Passed - 3/12/2021 12:47 PM        Passed - PCP or prescribing provider visit in past 12 months       Last office visit with prescriber/PCP: 8/27/2020 Umer Vega MD OR same dept: 8/27/2020 Umer Vega MD OR same specialty: 8/27/2020 Umer Vega MD  Last physical: Visit date not found Last MTM visit: Visit date not found   Next visit within 3 mo: Visit date not found  Next physical within 3 mo: Visit date not found  Prescriber OR PCP: Umer Vega MD  Last diagnosis associated with med order: 1. Type 2 diabetes mellitus with other diabetic kidney complication (H)  - lisinopriL (PRINIVIL,ZESTRIL) 10 MG tablet [Pharmacy Med Name: LISINOPRIL 10 MG TABLET]; TAKE 1 TABLET BY MOUTH EVERY DAY  Dispense: 90 tablet; Refill: 3    2. Type 2 diabetes mellitus with other specified complication, without long-term current use of insulin (H)  - atorvastatin (LIPITOR) 20 MG tablet [Pharmacy Med Name: ATORVASTATIN 20 MG TABLET]; TAKE 1 TABLET BY MOUTH EVERY DAY  Dispense: 90 tablet; Refill: 3  - metFORMIN (GLUCOPHAGE) 1000 MG tablet [Pharmacy Med Name: METFORMIN HCL 1,000 MG TABLET]; TAKE 1 TABLET BY MOUTH TWICE A DAY WITH MEALS  Dispense: 180 tablet;  Refill: 3    3. Mixed hyperlipidemia  - atorvastatin (LIPITOR) 20 MG tablet [Pharmacy Med Name: ATORVASTATIN 20 MG TABLET]; TAKE 1 TABLET BY MOUTH EVERY DAY  Dispense: 90 tablet; Refill: 3    If protocol passes may refill for 12 months if within 3 months of last provider visit (or a total of 15 months).             Passed - Serum Potassium in past 12 months     Lab Results   Component Value Date    Potassium 4.4 08/27/2020             Passed - Blood pressure filed in past 12 months     BP Readings from Last 1 Encounters:   08/27/20 90/62             Passed - Serum Creatinine in past 12 months     Creatinine   Date Value Ref Range Status   08/27/2020 0.73 0.60 - 1.10 mg/dL Final                atorvastatin (LIPITOR) 20 MG tablet [Pharmacy Med Name: ATORVASTATIN 20 MG TABLET] 90 tablet 3     Sig: TAKE 1 TABLET BY MOUTH EVERY DAY       Statins Refill Protocol (Hmg CoA Reductase Inhibitors) Passed - 3/12/2021 12:47 PM        Passed - PCP or prescribing provider visit in past 12 months      Last office visit with prescriber/PCP: 8/27/2020 Umer Vega MD OR same dept: 8/27/2020 Umer Vega MD OR same specialty: 8/27/2020 Umer Vega MD  Last physical: Visit date not found Last MTM visit: Visit date not found   Next visit within 3 mo: Visit date not found  Next physical within 3 mo: Visit date not found  Prescriber OR PCP: Umer Vega MD  Last diagnosis associated with med order: 1. Type 2 diabetes mellitus with other diabetic kidney complication (H)  - lisinopriL (PRINIVIL,ZESTRIL) 10 MG tablet [Pharmacy Med Name: LISINOPRIL 10 MG TABLET]; TAKE 1 TABLET BY MOUTH EVERY DAY  Dispense: 90 tablet; Refill: 3    2. Type 2 diabetes mellitus with other specified complication, without long-term current use of insulin (H)  - atorvastatin (LIPITOR) 20 MG tablet [Pharmacy Med Name: ATORVASTATIN 20 MG TABLET]; TAKE 1 TABLET BY MOUTH EVERY DAY  Dispense: 90 tablet; Refill: 3  - metFORMIN (GLUCOPHAGE) 1000  MG tablet [Pharmacy Med Name: METFORMIN HCL 1,000 MG TABLET]; TAKE 1 TABLET BY MOUTH TWICE A DAY WITH MEALS  Dispense: 180 tablet; Refill: 3    3. Mixed hyperlipidemia  - atorvastatin (LIPITOR) 20 MG tablet [Pharmacy Med Name: ATORVASTATIN 20 MG TABLET]; TAKE 1 TABLET BY MOUTH EVERY DAY  Dispense: 90 tablet; Refill: 3    If protocol passes may refill for 12 months if within 3 months of last provider visit (or a total of 15 months).                metFORMIN (GLUCOPHAGE) 1000 MG tablet [Pharmacy Med Name: METFORMIN HCL 1,000 MG TABLET] 180 tablet 3     Sig: TAKE 1 TABLET BY MOUTH TWICE A DAY WITH MEALS       Metformin Refill Protocol Failed - 3/12/2021 12:47 PM        Failed - LFT or AST or ALT in last 12 months     Albumin   Date Value Ref Range Status   02/13/2020 4.0 3.5 - 5.0 g/dL Final     Bilirubin, Total   Date Value Ref Range Status   02/13/2020 0.4 0.0 - 1.0 mg/dL Final     Alkaline Phosphatase   Date Value Ref Range Status   02/13/2020 108 45 - 120 U/L Final     AST   Date Value Ref Range Status   02/13/2020 15 0 - 40 U/L Final     ALT   Date Value Ref Range Status   02/13/2020 28 0 - 45 U/L Final     Protein, Total   Date Value Ref Range Status   02/13/2020 7.2 6.0 - 8.0 g/dL Final                Failed - Visit with PCP or prescribing provider visit in last 6 months or next 3 months     Last office visit with prescriber/PCP: Visit date not found OR same dept: 8/27/2020 Umer Vega MD OR same specialty: 8/27/2020 Umer Vega MD Last physical: Visit date not found Last MTM visit: Visit date not found         Next appt within 3 mo: Visit date not found  Next physical within 3 mo: Visit date not found  Prescriber OR PCP: Umer Vega MD  Last diagnosis associated with med order: 1. Type 2 diabetes mellitus with other diabetic kidney complication (H)  - lisinopriL (PRINIVIL,ZESTRIL) 10 MG tablet [Pharmacy Med Name: LISINOPRIL 10 MG TABLET]; TAKE 1 TABLET BY MOUTH EVERY DAY  Dispense: 90  tablet; Refill: 3    2. Type 2 diabetes mellitus with other specified complication, without long-term current use of insulin (H)  - atorvastatin (LIPITOR) 20 MG tablet [Pharmacy Med Name: ATORVASTATIN 20 MG TABLET]; TAKE 1 TABLET BY MOUTH EVERY DAY  Dispense: 90 tablet; Refill: 3  - metFORMIN (GLUCOPHAGE) 1000 MG tablet [Pharmacy Med Name: METFORMIN HCL 1,000 MG TABLET]; TAKE 1 TABLET BY MOUTH TWICE A DAY WITH MEALS  Dispense: 180 tablet; Refill: 3    3. Mixed hyperlipidemia  - atorvastatin (LIPITOR) 20 MG tablet [Pharmacy Med Name: ATORVASTATIN 20 MG TABLET]; TAKE 1 TABLET BY MOUTH EVERY DAY  Dispense: 90 tablet; Refill: 3     If protocol passes may refill for 12 months if within 3 months of last provider visit (or a total of 15 months).           Failed - A1C in last 6 months     Hemoglobin A1c   Date Value Ref Range Status   08/27/2020 9.3 (H) <=5.6 % Final     Comment:     Normal <5.7% Prediabete 5.7-6.4% Diabletes 6.5% or higher - adopted from ADA consensus guidelines               Failed - Microalbumin in last year      Microalbumin, Random Urine   Date Value Ref Range Status   02/13/2020 2.67 (H) 0.00 - 1.99 mg/dL Final                  Passed - Blood pressure in last 12 months     BP Readings from Last 1 Encounters:   08/27/20 90/62             Passed - GFR or Serum Creatinine in last 6 months     GFR MDRD Non Af Amer   Date Value Ref Range Status   08/27/2020 >60 >60 mL/min/1.73m2 Final     GFR MDRD Af Amer   Date Value Ref Range Status   08/27/2020 >60 >60 mL/min/1.73m2 Final

## 2021-06-16 PROBLEM — M48.062 SPINAL STENOSIS, LUMBAR REGION, WITH NEUROGENIC CLAUDICATION: Status: ACTIVE | Noted: 2020-05-27

## 2021-06-16 PROBLEM — Z72.0 TOBACCO ABUSE: Status: ACTIVE | Noted: 2020-02-13

## 2021-06-16 PROBLEM — E66.9 OBESITY: Status: ACTIVE | Noted: 2020-02-13

## 2021-06-16 PROBLEM — R80.9 MICROALBUMINURIA DUE TO TYPE 2 DIABETES MELLITUS (H): Status: ACTIVE | Noted: 2020-02-14

## 2021-06-16 PROBLEM — E66.01 MORBID OBESITY (H): Status: ACTIVE | Noted: 2021-03-17

## 2021-06-16 PROBLEM — M17.12 OSTEOARTHRITIS OF LEFT KNEE, UNSPECIFIED OSTEOARTHRITIS TYPE: Status: ACTIVE | Noted: 2020-05-27

## 2021-06-16 PROBLEM — E11.29 MICROALBUMINURIA DUE TO TYPE 2 DIABETES MELLITUS (H): Status: ACTIVE | Noted: 2020-02-14

## 2021-06-16 PROBLEM — I10 BENIGN ESSENTIAL HYPERTENSION: Status: ACTIVE | Noted: 2020-05-27

## 2021-06-16 NOTE — PROGRESS NOTES
Office Visit - Follow Up   Elissa Grajeda   45 y.o. female    Date of Visit: 3/17/2021    Chief Complaint   Patient presents with     Follow-up     Fasting med check. Usual knee pain noted per patient.        -------------------------------------------------------------------------------------------------------------------------  Assessment and Plan    Follow-up for multiple issues since her previous visit of August 2020.  Overall, she feels about the same.     Blood pressures well controlled, she told me that her blood sugars are running about 1 .  She has been taking Trulicity at the 0.75 mg dose, and I will transmit prescription for today for her to go to the 1.5 mg dose.    She is fasting this afternoon, so we will get a lipid panel, comprehensive metabolic panel, A1c, thyroid cascade, and blood cell counts, also check vitamin D level.    Type 2 diabetes, possible neuropathy complications with pain in her legs and numbness in her fingers, and decreased nylon monofilament sensation right great toe; microalbuminuria demonstrated February 13, 2020.  Will increase Trulicity up to 1.5 mg/week    Lab Results   Component Value Date    HGBA1C 9.3 (H) 08/27/2020     She reports getting home blood sugar readings of around 120-130    I consider her A1c goal to be 7 or less.  She confirmed that she is taking metformin at the full dose of 1000 mg twice a day.    For microalbuminuria, she is on lisinopril 10 mg a day    She recalls undergoing an eye exam in February 2020, which would be good for a year.  They did examine her retina and told her that it looked okay    She would be due for an eye exam approximately now, meaning March 2021    Hyperlipidemia, restarted on atorvastatin,  goal of LDL cholesterol less than 70.  Lab Results   Component Value Date    CHOL 208 (H) 02/13/2020    CHOL 188 08/03/2016    CHOL 288 (H) 04/19/2016     Lab Results   Component Value Date    HDL 40 (L) 02/13/2020    HDL 42 (L)  08/03/2016    HDL 49 (L) 04/19/2016     Lab Results   Component Value Date    LDLCALC 122 02/13/2020    LDLCALC 103 08/03/2016    LDLCALC 203 (H) 04/19/2016     Lab Results   Component Value Date    TRIG 229 (H) 02/13/2020    TRIG 215 (H) 08/03/2016    TRIG 178 (H) 04/19/2016     No components found for: CHOLHDL    Cigarette smoking, which definitely needs to stop in light of her diabetes.  She smokes approximately about a pack a day, and at that level of consumption, she may have a degree of physiologic nicotine dependence.     She has nicotine gum on hand, which I prescribed for her back in August 2020.  Given her mental health conditions, I would not use Chantix.  If we do not use nicotine or Chantix, I think she is going to have to do this the old-fashioned way.    Obesity with complications of diabetes and hyperlipidemia, needs to eventually lose about 50 pounds or more.  She estimates that her weight is actually dropped by about 30 pounds over the last year.     Wt Readings from Last 3 Encounters:   03/17/21 207 lb (93.9 kg)   08/27/20 206 lb 8 oz (93.7 kg)   05/27/20 206 lb 1.6 oz (93.5 kg)     Anxiety, depression, insomnia, which are under reasonably good control now on a rather complex medication regimen, currently working with [facility] nurse practitioner Corinne Deluna     Her medication regimen is a rather complex when the consistent for drugs that are managed at [facility]. These are aripiprazole, duloxetine, gabapentin, and mirtazapine.  Recently Mr. Purdy added Cogentin 0.5 mg at bedtime to her regimen, which she finds quite helpful.  She told me that the combination is working well.  She does not think that any of these medications contributed to weight gain.    Chronic low back pain, for which she was been working with [facility] and was told about lumbar spinal stenosis.  We have not received any documents from [facility], and it would be useful to see the results of  their imaging studies, which she recalls includes a lumbar spine MRI.  She was told that she has noncritical lumbar spinal stenosis.  She did start physical therapy at [facility], and I told her that she needs to do the home exercises, which she is currently not doing.    I told her that the leg pain could be from lumbar spinal stenosis, or radicular symptoms (nerve root pain from bad lumbar disks).  But it could also represent diabetic neuropathy.  I asked her to ask [facility] to send us their documents, even better yet she should get her own copy, and bring them here.    Chronic left knee pain, probably degenerative arthritis.    Left knee x-ray performed February 20, 2020 confirmed moderate primary osteoarthritis in all 3 compartments also a tiny effusion, but no fracture    I encouraged her to start isometric exercise for her back and her knee.  I demonstrated a few of the maneuvers here in the exam room.    She leads a busy life, and might not be able to spare the time to have meetings with a physical therapist.    I suggested that she go online to [facility], which is a YouTube channel which teaches the very same home exercises that were taught by onsite physical therapist.    Needs routine GYN care.    I do recommend that she get the Covid vaccine when available to her.        Immunization History   Administered Date(s) Administered     INFLUENZA,SEASONAL QUAD, PF, =/> 6months 02/13/2020     Influenza, seasonal,quad inj 6-35 mos 10/02/2014     Td,adult,historic,unspecified 05/15/2012     Tdap 05/15/2012       --------------------------------------------------------------------------------------------------------------------------  History of Present Illness  This 45 y.o. old     Follow-up for multiple issues since her previous visit of August 2020.  Overall, she feels about the same.     Blood pressures well controlled, she told me that her blood sugars are running about  1 .  She has been taking Trulicity at the 0.75 mg dose, and I will transmit prescription for today for her to go to the 1.5 mg dose.    She is fasting this afternoon, so we will get a lipid panel, comprehensive metabolic panel, A1c, thyroid cascade, and blood cell counts, also check vitamin D level.    Type 2 diabetes, possible neuropathy complications with pain in her legs and numbness in her fingers, and decreased nylon monofilament sensation right great toe; microalbuminuria demonstrated February 13, 2020.  Will increase Trulicity up to 1.5 mg/week      Wt Readings from Last 3 Encounters:   03/17/21 207 lb (93.9 kg)   08/27/20 206 lb 8 oz (93.7 kg)   05/27/20 206 lb 1.6 oz (93.5 kg)     BP Readings from Last 3 Encounters:   03/17/21 126/85   08/27/20 90/62   05/27/20 132/88       Lab Results   Component Value Date    WBC 11.7 (H) 02/13/2020    HGB 15.7 02/13/2020    HCT 48.2 (H) 02/13/2020     02/13/2020    CHOL 208 (H) 02/13/2020    TRIG 229 (H) 02/13/2020    HDL 40 (L) 02/13/2020    ALT 28 02/13/2020    AST 15 02/13/2020     08/27/2020    K 4.4 08/27/2020     08/27/2020    CREATININE 0.73 08/27/2020    BUN 9 08/27/2020    CO2 23 08/27/2020    TSH 2.07 02/13/2020    HGBA1C 9.3 (H) 08/27/2020    MICROALBUR 2.67 (H) 02/13/2020        ---------------------------------------------------------------------------------------------------------------------------    Medications, Allergies, Social, and Problem List   Current Outpatient Medications   Medication Sig Dispense Refill     ARIPiprazole (ABILIFY) 10 MG tablet Take 10 mg by mouth daily.        atorvastatin (LIPITOR) 20 MG tablet TAKE 1 TABLET BY MOUTH EVERY DAY 90 tablet 3     benztropine (COGENTIN) 0.5 MG tablet TAKE 1 TABLET BY MOUTH EVERYDAY AT BEDTIME       blood glucose meter (GLUCOMETER) Test TWICE daily. Dispense glucometer brand per patient's insurance at pharmacy discretion. 1 each 0     blood glucose test strips Test TWICE daily.  Dispense brand per patient's insurance at pharmacy discretion. 100 strip 11     dulaglutide (TRULICITY) 1.5 mg/0.5 mL PnIj Inject 1.5 mg under the skin every 7 days. 4 Syringe 11     DULoxetine (CYMBALTA) 60 MG capsule Take 120 mg by mouth daily.        gabapentin (NEURONTIN) 300 MG capsule 600 mg 2 (two) times a day.  2     generic lancets (FINGERSTIX LANCETS) Test TWICE daily. Dispense brand per patient's insurance at pharmacy discretion. 100 each 11     lisinopriL (PRINIVIL,ZESTRIL) 10 MG tablet TAKE 1 TABLET BY MOUTH EVERY DAY 90 tablet 3     metFORMIN (GLUCOPHAGE) 1000 MG tablet TAKE 1 TABLET BY MOUTH TWICE A DAY WITH MEALS 180 tablet 3     mirtazapine (REMERON) 15 MG tablet TAKE 1 TABLET BY MOUTH EVERYDAY AT BEDTIME       No current facility-administered medications for this visit.      No Known Allergies  Social History     Tobacco Use     Smoking status: Current Every Day Smoker     Packs/day: 1.00     Years: 20.00     Pack years: 20.00     Types: Cigarettes     Smokeless tobacco: Never Used   Substance Use Topics     Alcohol use: Not on file     Drug use: Not on file     Patient Active Problem List   Diagnosis     Type 2 diabetes mellitus (H)     Anxiety     Insomnia     Hyperlipidemia     Tobacco abuse     Obesity     Microalbuminuria due to type 2 diabetes mellitus (H)     Benign essential hypertension     Spinal stenosis, lumbar region, with neurogenic claudication     Osteoarthritis of left knee, unspecified osteoarthritis type-- xray Feb 2020        Reviewed, reconciled and updated       Physical Exam   General Appearance:   Appears well, blood pressure is well controlled, body mass index is 37    /85 (Patient Site: Right Arm, Patient Position: Sitting, Cuff Size: Adult Large)   Pulse 88   Temp 97.6  F (36.4  C) (Other) Comment (Src): forehead  Wt 207 lb (93.9 kg)   SpO2 99%   BMI 37.56 kg/m      General: Alert, in no distress  Skin: No significant lesion seen.  Eyes/nose/throat: Eyes  without scleral icterus, eye movements normal, pupils equal and reactive, oropharynx clear, ears with normal TM's  MSK: Neck with good ROM  Lymphatic: Neck without adenopathy or masses  Endocrine: Thyroid with no nodules to palpation  Pulm: Lungs clear to auscultation bilaterally  Cardiac: Heart with regular rate and rhythm, no murmur or gallop  GI: Abdomen obese, soft, nontender. No palpable enlargement of liver or spleen  MSK: Extremities no tenderness or edema  Neuro: Moves all extremities, without focal weakness  Psych: Alert, normal mental status. Normal affect and speech    Diabetic foot exam notable for slightly diminished sensation to nylon monofilament testing in her right great toe  Good tissue integrity, good pulses       Additional Information   I spent 30 minutes on this encounter, including reviewing interval history since last visit, examining the patient, explaining and counseling the issues enumerated in the Assessment and Plan (patient given a copy), ordering indicated tests, ordering prescriptions     Umer Vega MD

## 2021-06-16 NOTE — PATIENT INSTRUCTIONS - HE
Follow-up for multiple issues since her previous visit of August 2020.  Overall, she feels about the same.   Blood pressures well controlled, she told me that her blood sugars are running about 1 .  She has been taking Trulicity at the 0.75 mg dose, and I will transmit prescription for today for her to go to the 1.5 mg dose.    She is fasting this afternoon, so we will get a lipid panel, comprehensive metabolic panel, A1c, thyroid cascade, and blood cell counts, also check vitamin D level.    Type 2 diabetes, possible neuropathy complications with pain in her legs and numbness in her fingers, and decreased nylon monofilament sensation right great toe; microalbuminuria demonstrated February 13, 2020.  Will increase Trulicity up to 1.5 mg/week    Lab Results   Component Value Date    HGBA1C 9.3 (H) 08/27/2020     She reports getting home blood sugar readings of around 120-130    I consider her A1c goal to be 7 or less.  She confirmed that she is taking metformin at the full dose of 1000 mg twice a day.    For microalbuminuria, she is on lisinopril 10 mg a day    She recalls undergoing an eye exam in February 2020, which would be good for a year.  They did examine her retina and told her that it looked okay    She would be due for an eye exam approximately now, meaning March 2021    Hyperlipidemia, restarted on atorvastatin,  goal of LDL cholesterol less than 70.  Lab Results   Component Value Date    CHOL 208 (H) 02/13/2020    CHOL 188 08/03/2016    CHOL 288 (H) 04/19/2016     Lab Results   Component Value Date    HDL 40 (L) 02/13/2020    HDL 42 (L) 08/03/2016    HDL 49 (L) 04/19/2016     Lab Results   Component Value Date    LDLCALC 122 02/13/2020    LDLCALC 103 08/03/2016    LDLCALC 203 (H) 04/19/2016     Lab Results   Component Value Date    TRIG 229 (H) 02/13/2020    TRIG 215 (H) 08/03/2016    TRIG 178 (H) 04/19/2016     No components found for: CHOLHDL    Cigarette smoking, which definitely needs to stop in  light of her diabetes.  She smokes approximately about a pack a day, and at that level of consumption, she may have a degree of physiologic nicotine dependence.     She has nicotine gum on hand, which I prescribed for her back in August 2020.  Given her mental health conditions, I would not use Chantix.  If we do not use nicotine or Chantix, I think she is going to have to do this the old-fashioned way.    Obesity with complications of diabetes and hyperlipidemia, needs to eventually lose about 50 pounds or more.  She estimates that her weight is actually dropped by about 30 pounds over the last year.     Wt Readings from Last 3 Encounters:   03/17/21 207 lb (93.9 kg)   08/27/20 206 lb 8 oz (93.7 kg)   05/27/20 206 lb 1.6 oz (93.5 kg)     Anxiety, depression, insomnia, which are under reasonably good control now on a rather complex medication regimen, currently working with [facility] nurse practitioner Corinne Deluna     Her medication regimen is a rather complex when the consistent for drugs that are managed at [facility]. These are aripiprazole, duloxetine, gabapentin, and mirtazapine.  Recently Mr. Purdy added Cogentin 0.5 mg at bedtime to her regimen, which she finds quite helpful.  She told me that the combination is working well.  She does not think that any of these medications contributed to weight gain.    Chronic low back pain, for which she was been working with [facility] and was told about lumbar spinal stenosis.  We have not received any documents from [facility], and it would be useful to see the results of their imaging studies, which she recalls includes a lumbar spine MRI.  She was told that she has noncritical lumbar spinal stenosis.  She did start physical therapy at [facility], and I told her that she needs to do the home exercises, which she is currently not doing.    I told her that the leg pain could be from lumbar spinal stenosis, or radicular symptoms  (nerve root pain from bad lumbar disks).  But it could also represent diabetic neuropathy.  I asked her to ask [facility] to send us their documents, even better yet she should get her own copy, and bring them here.    Chronic left knee pain, probably degenerative arthritis.    Left knee x-ray performed February 20, 2020 confirmed moderate primary osteoarthritis in all 3 compartments also a tiny effusion, but no fracture    I encouraged her to start isometric exercise for her back and her knee.  I demonstrated a few of the maneuvers here in the exam room.    She leads a busy life, and might not be able to spare the time to have meetings with a physical therapist.    I suggested that she go online to [facility], which is a YouTube channel which teaches the very same home exercises that were taught by onsite physical therapist.    Needs routine GYN care.  I do recommend that she get the Covid vaccine when available to her.

## 2021-06-16 NOTE — TELEPHONE ENCOUNTER
Refill Approved    Rx renewed per Medication Renewal Policy. Medication was last renewed on 2/13/20.    Mark Caal, [facility] Triage/Med Refill 3/22/2021     Requested Prescriptions   Pending Prescriptions Disp Refills     ACCU-CHEK GUIDE TEST STRIPS strips [Pharmacy Med Name: ACCU-CHEK GUIDE TEST STRIP] 100 strip 11     Sig: USE TO TEST TWICE DAILY       Diabetic Supplies Refill Protocol Passed - 3/20/2021 12:23 AM        Passed - Visit with PCP or prescribing provider visit in last 6 months     Last office visit with prescriber/PCP: 3/17/2021 Umer Vega MD OR same dept: 3/17/2021 Umer Vega MD OR same specialty: 3/17/2021 Umer Vega MD  Last physical: Visit date not found Last MTM visit: Visit date not found   Next visit within 3 mo: Visit date not found  Next physical within 3 mo: Visit date not found  Prescriber OR PCP: Umer Vega MD  Last diagnosis associated with med order: 1. Type 2 diabetes mellitus with other specified complication, without long-term current use of insulin (H)  - ACCU-CHEK GUIDE TEST STRIPS strips [Pharmacy Med Name: ACCU-CHEK GUIDE TEST STRIP]; USE TO TEST TWICE DAILY  Dispense: 100 strip; Refill: 11    If protocol passes may refill for 12 months if within 3 months of last provider visit (or a total of 15 months).             Passed - A1C in last 6 months     Hemoglobin A1c   Date Value Ref Range Status   03/17/2021 7.9 (H) <=5.6 % Final

## 2021-06-20 NOTE — LETTER
Letter by Umer Vega MD at      Author: Umer Vega MD Service: -- Author Type: --    Filed:  Encounter Date: 3/2/2020 Status: (Other)         Elissa JANNIE Jorge Ljoey  [address]  [place] [address]               March 2, 2020    Dear Elissa:    Our records indicate that you are due for a mammogram.    In the [place], one in nine women will develop breast cancer during their lifetime. While there is no way to prevent breast cancer, early detection provides the best opportunity for curing it.    For women over the age of 40, the American Cancer Society recommends a yearly clinical breast exam and a yearly mammogram. These practices have saved thousands of lives. We need your help to ensure that you are receiving optimal medical care.    Please make an appointment for a mammogram at your earliest convenience.  [facility] Scheduling [phone].    Sincerely,      Umer Vega MD

## 2021-07-01 VITALS
DIASTOLIC BLOOD PRESSURE: 85 MMHG | OXYGEN SATURATION: 99 % | SYSTOLIC BLOOD PRESSURE: 126 MMHG | WEIGHT: 207 LBS | HEART RATE: 88 BPM | TEMPERATURE: 97.6 F | BODY MASS INDEX: 37.56 KG/M2

## 2021-08-22 ENCOUNTER — HEALTH MAINTENANCE LETTER (OUTPATIENT)
Age: 46
End: 2021-08-22

## 2021-10-17 ENCOUNTER — HEALTH MAINTENANCE LETTER (OUTPATIENT)
Age: 46
End: 2021-10-17

## 2021-11-02 ENCOUNTER — TELEPHONE (OUTPATIENT)
Dept: INTERNAL MEDICINE | Facility: CLINIC | Age: 46
End: 2021-11-02

## 2021-11-02 NOTE — TELEPHONE ENCOUNTER
Patient scheduled for tomorrow evening at [facility].  Marcella Jim CMA ............... 11:22 AM, 11/02/21

## 2021-11-02 NOTE — TELEPHONE ENCOUNTER
Reason for Call:  Same Day Appointment, Requested Provider:  PRE OP    PCP: Umer Navarro    Reason for visit: PRE OP    Duration of symptoms: UNKNOWN    Have you been treated for this in the past? UNKNOWN    Additional comments: PLEASE CALL PT, TO SCHEDULE PRE OP WITH DR. NAVARRO, FOR  PRE OP SURGERY DATE 11/8/21 WITH DR. SANTOS, AT [facility], [place]    Can we leave a detailed message on this number? YES    Phone number patient can be reached at: Cell number on file:    Telephone Information:   Mobile [phone]       Best Time: ANYTIME    Call taken on 11/2/2021 at 10:44 AM by Maye Gao

## 2021-11-03 ENCOUNTER — OFFICE VISIT (OUTPATIENT)
Dept: FAMILY MEDICINE | Facility: CLINIC | Age: 46
End: 2021-11-03
Payer: OTHER GOVERNMENT

## 2021-11-03 VITALS
WEIGHT: 208.6 LBS | BODY MASS INDEX: 39.38 KG/M2 | DIASTOLIC BLOOD PRESSURE: 76 MMHG | HEIGHT: 61 IN | HEART RATE: 88 BPM | SYSTOLIC BLOOD PRESSURE: 112 MMHG

## 2021-11-03 DIAGNOSIS — E78.5 DYSLIPIDEMIA: ICD-10-CM

## 2021-11-03 DIAGNOSIS — I10 PRIMARY HYPERTENSION: ICD-10-CM

## 2021-11-03 DIAGNOSIS — M25.521 RIGHT ELBOW PAIN: ICD-10-CM

## 2021-11-03 DIAGNOSIS — G56.01 CARPAL TUNNEL SYNDROME OF RIGHT WRIST: ICD-10-CM

## 2021-11-03 DIAGNOSIS — Z01.818 PREOP GENERAL PHYSICAL EXAM: Primary | ICD-10-CM

## 2021-11-03 DIAGNOSIS — F33.0 MILD EPISODE OF RECURRENT MAJOR DEPRESSIVE DISORDER (H): ICD-10-CM

## 2021-11-03 DIAGNOSIS — E11.9 TYPE 2 DIABETES MELLITUS WITHOUT COMPLICATION, WITHOUT LONG-TERM CURRENT USE OF INSULIN (H): ICD-10-CM

## 2021-11-03 LAB
ERYTHROCYTE [DISTWIDTH] IN BLOOD BY AUTOMATED COUNT: 12.4 % (ref 10–15)
HBA1C MFR BLD: 7.8 % (ref 0–5.6)
HCT VFR BLD AUTO: 42.5 % (ref 35–47)
HGB BLD-MCNC: 14.6 G/DL (ref 11.7–15.7)
MCH RBC QN AUTO: 31.5 PG (ref 26.5–33)
MCHC RBC AUTO-ENTMCNC: 34.4 G/DL (ref 31.5–36.5)
MCV RBC AUTO: 92 FL (ref 78–100)
PLATELET # BLD AUTO: 345 10E3/UL (ref 150–450)
RBC # BLD AUTO: 4.64 10E6/UL (ref 3.8–5.2)
WBC # BLD AUTO: 14.3 10E3/UL (ref 4–11)

## 2021-11-03 PROCEDURE — 36415 COLL VENOUS BLD VENIPUNCTURE: CPT | Performed by: FAMILY MEDICINE

## 2021-11-03 PROCEDURE — 83036 HEMOGLOBIN GLYCOSYLATED A1C: CPT | Performed by: FAMILY MEDICINE

## 2021-11-03 PROCEDURE — 99215 OFFICE O/P EST HI 40 MIN: CPT | Performed by: FAMILY MEDICINE

## 2021-11-03 PROCEDURE — 93005 ELECTROCARDIOGRAM TRACING: CPT | Performed by: FAMILY MEDICINE

## 2021-11-03 PROCEDURE — 80048 BASIC METABOLIC PNL TOTAL CA: CPT | Performed by: FAMILY MEDICINE

## 2021-11-03 PROCEDURE — 93010 ELECTROCARDIOGRAM REPORT: CPT | Performed by: INTERNAL MEDICINE

## 2021-11-03 PROCEDURE — 85027 COMPLETE CBC AUTOMATED: CPT | Performed by: FAMILY MEDICINE

## 2021-11-03 ASSESSMENT — MIFFLIN-ST. JEOR: SCORE: 1513.96

## 2021-11-03 NOTE — PROGRESS NOTES
[facility]  [address] [address]  Phone: [phone]  Fax: [phone]  Primary Provider: Umer Vega  Pre-op Performing Provider: JORDAN BEARDEN      PREOPERATIVE EVALUATION:  Today's date: 11/3/2021    Elissa Grajeda is a 46 year old female who presents for a preoperative evaluation.    Surgical Information:  Surgery/Procedure: Cubital Tunnel Syndrome Right Elbow   Surgery Location: [facility] in [place]   Surgeon: Dr. Yaya Oates  Surgery Date: 11/08/2021  Time of Surgery: 1:00pm  Where patient plans to recover: At home with family  Fax number for surgical facility: [phone]    Type of Anesthesia Anticipated: to be determined    Assessment & Plan     The proposed surgical procedure is considered INTERMEDIATE risk.    1.  Preop general physical exam, chr right elbow pain, right carpal tunnel    - EKG 12-lead, tracing only  - CBC with platelets  - CBC with platelets    Risks and Recommendations:  The patient has the following additional risks and recommendations for perioperative complications:   - No identified additional risk factors other than previously addressed    RECOMMENDATION:  APPROVAL GIVEN to proceed with proposed procedure, without further diagnostic evaluation.    I personally reviewed the EKG which showed normal sinus rhythm.  Blood pressure is under good control.  She is on medication for diabetes.  She has no cardiac symptoms at present.  RCRI risk of zero, class 1.  Activity level > 4METs    2.  Type 2 diabetes mellitus without complication, without long-term current use of insulin (H)  A1c=7.8, not optimal. Advised she schedule a diabetic visit with PCP after surgery.  In preparation for surgery, hold metformin 2 days prior  Continue with trulicity  - Hemoglobin A1c  - Hemoglobin A1c    3.  Primary hypertension  Stable on lisinopril 10 mg  No change in medication in preparation for surgery    - Basic metabolic panel  (Ca, Cl, CO2, Creat, Gluc, K, Na, BUN)  - Basic metabolic panel  (Ca, Cl, CO2, Creat, Gluc, K, Na, BUN)    4.  Dyslipidemia  Continue with atorvastatin  No change in medication in preparation for surgery     5.  Mild episode of recurrent major depressive disorder (H), anxiety, sleep  She sees a psychiatrist and therapist  She will continue with mirtazapine, gabapentin, duloxetine, Abilify  No change in medication in preparation for surgery       Subjective     HPI related to upcoming procedure:     Preop Questions 11/3/2021   1. Have you ever had a heart attack or stroke? No   2. Have you ever had surgery on your heart or blood vessels, such as a stent placement, a coronary artery bypass, or surgery on an artery in your head, neck, heart, or legs? No   3. Do you have chest pain with activity? No   4. Do you have a history of  heart failure? No   5. Do you currently have a cold, bronchitis or symptoms of other infection? No   6. Do you have a cough, shortness of breath, or wheezing? No   7. Do you or anyone in your family have previous history of blood clots? No   8. Do you or does anyone in your family have a serious bleeding problem such as prolonged bleeding following surgeries or cuts? No   9. Have you ever had problems with anemia or been told to take iron pills? No   10. Have you had any abnormal blood loss such as black, tarry or bloody stools, or abnormal vaginal bleeding? No   11. Have you ever had a blood transfusion? No   12. Are you willing to have a blood transfusion if it is medically needed before, during, or after your surgery? Yes   13. Have you or any of your relatives ever had problems with anesthesia? No   14. Do you have sleep apnea, excessive snoring or daytime drowsiness? No   15. Do you have any artifical heart valves or other implanted medical devices like a pacemaker, defibrillator, or continuous glucose monitor? No   16. Do you have artificial joints? No   17. Are  you allergic to latex? No   18. Is there any chance that you may be pregnant? No     Preoperative Review of :   reviewed - no record of controlled substances prescribed.      Status of Chronic Conditions:  See problem list for active medical problems.  Problems all longstanding and stable, except as noted/documented.  See ROS for pertinent symptoms related to these conditions.      Review of Systems  CONSTITUTIONAL: NEGATIVE for fever, chills, change in weight  INTEGUMENTARY/SKIN: NEGATIVE for worrisome rashes, moles or lesions  EYES: NEGATIVE for vision changes or irritation  ENT/MOUTH: NEGATIVE for ear, mouth and throat problems  RESP: NEGATIVE for significant cough or SOB  CV: NEGATIVE for chest pain, palpitations or peripheral edema  GI: NEGATIVE for nausea, abdominal pain, heartburn, or change in bowel habits  : NEGATIVE for frequency, dysuria, or hematuria  MUSCULOSKELETAL: NEGATIVE for significant arthralgias or myalgia  NEURO: NEGATIVE for weakness, dizziness or paresthesias  ENDOCRINE: NEGATIVE for temperature intolerance, skin/hair changes  HEME: NEGATIVE for bleeding problems  PSYCHIATRIC: NEGATIVE for changes in mood or affect    Patient Active Problem List    Diagnosis Date Noted     Obesity (BMI 35.0-39.9) with comorbidity (H) 03/17/2021     Priority: Medium     Benign essential hypertension 05/27/2020     Priority: Medium     Spinal stenosis, lumbar region, with neurogenic claudication 05/27/2020     Priority: Medium     Osteoarthritis of left knee, unspecified osteoarthritis type-- xray Feb 2020 05/27/2020     Priority: Medium     Microalbuminuria due to type 2 diabetes mellitus (H) 02/14/2020     Priority: Medium     Tobacco abuse 02/13/2020     Priority: Medium     Obesity 02/13/2020     Priority: Medium     Type 2 diabetes mellitus (H)      Priority: Medium     Created by Conversion         Hyperlipidemia      Priority: Medium     Created by Conversion         Anxiety      Priority: Medium      Created by Conversion  Replacement Utility updated for latest IMO load         Insomnia      Priority: Medium     Created by Conversion          Past Medical History:   Diagnosis Date     Diabetes (H)      Herniated disc      Spinal stenosis      No past surgical history on file.  Current Outpatient Medications   Medication Sig Dispense Refill     ACCU-CHEK GUIDE TEST STRIPS strips [ACCU-CHEK GUIDE TEST STRIPS STRIPS] USE TO TEST TWICE DAILY 200 strip 3     ARIPiprazole (ABILIFY) 10 MG tablet [ARIPIPRAZOLE (ABILIFY) 10 MG TABLET] Take 10 mg by mouth daily.        atorvastatin (LIPITOR) 20 MG tablet [ATORVASTATIN (LIPITOR) 20 MG TABLET] TAKE 1 TABLET BY MOUTH EVERY DAY 90 tablet 3     benztropine (COGENTIN) 0.5 MG tablet [BENZTROPINE (COGENTIN) 0.5 MG TABLET] TAKE 1 TABLET BY MOUTH EVERYDAY AT BEDTIME       blood glucose meter (GLUCOMETER) [BLOOD GLUCOSE METER (GLUCOMETER)] Test TWICE daily. Dispense glucometer brand per patient's insurance at pharmacy discretion. 1 each 0     dulaglutide (TRULICITY) 1.5 mg/0.5 mL PnIj [DULAGLUTIDE (TRULICITY) 1.5 MG/0.5 ML PNIJ] Inject 1.5 mg under the skin every 7 days. 12 Syringe 3     DULoxetine (CYMBALTA) 60 MG capsule [DULOXETINE (CYMBALTA) 60 MG CAPSULE] Take 120 mg by mouth daily.        gabapentin (NEURONTIN) 300 MG capsule [GABAPENTIN (NEURONTIN) 300 MG CAPSULE] 600 mg 2 (two) times a day.  2     generic lancets (FINGERSTIX LANCETS) [GENERIC LANCETS (FINGERSTIX LANCETS)] Test TWICE daily. Dispense brand per patient's insurance at pharmacy discretion. 100 each 11     ketoconazole (NIZORAL) 2 % cream [KETOCONAZOLE (NIZORAL) 2 % CREAM] Apply sparingly to rash two times a day 30 g 0     lisinopriL (PRINIVIL,ZESTRIL) 10 MG tablet [LISINOPRIL (PRINIVIL,ZESTRIL) 10 MG TABLET] TAKE 1 TABLET BY MOUTH EVERY DAY 90 tablet 3     metFORMIN (GLUCOPHAGE) 1000 MG tablet [METFORMIN (GLUCOPHAGE) 1000 MG TABLET] TAKE 1 TABLET BY MOUTH TWICE A DAY WITH MEALS 180 tablet 3     mirtazapine  "(REMERON) 15 MG tablet [MIRTAZAPINE (REMERON) 15 MG TABLET] TAKE 1 TABLET BY MOUTH EVERYDAY AT BEDTIME       triamcinolone (KENALOG) 0.1 % ointment [TRIAMCINOLONE (KENALOG) 0.1 % OINTMENT] Apply sparingly to the rash 2-3 times a day 30 g 0       No Known Allergies     Social History     Tobacco Use     Smoking status: Current Every Day Smoker     Packs/day: 1.00     Years: 20.00     Pack years: 20.00     Types: Cigarettes     Smokeless tobacco: Never Used   Substance Use Topics     Alcohol use: Not on file     Family History   Problem Relation Age of Onset     Coronary Artery Disease Father         78     History   Drug Use Not on file         Objective     /76 (BP Location: Left arm, Patient Position: Sitting, Cuff Size: Adult Large)   Pulse 88   Ht 1.55 m (5' 1.02\")   Wt 94.6 kg (208 lb 9.6 oz)   LMP 10/20/2021 (Approximate)   Breastfeeding No   BMI 39.38 kg/m      Physical Exam    GENERAL APPEARANCE: healthy, alert and no distress     EYES: EOMI, PERRL     HENT: ear canals and TM's normal and nose and mouth without ulcers or lesions     NECK: no adenopathy, no asymmetry, masses, or scars and thyroid normal to palpation     RESP: lungs clear to auscultation - no rales, rhonchi or wheezes     CV: regular rates and rhythm, normal S1 S2, no S3 or S4 and no murmur, click or rub     ABDOMEN:  soft, nontender, no HSM or masses and bowel sounds normal     MS: extremities normal- no gross deformities noted, no evidence of inflammation in joints, FROM in all extremities.     SKIN: no suspicious lesions or rashes     NEURO: Normal strength and tone, sensory exam grossly normal, mentation intact and speech normal     PSYCH: mentation appears normal. and affect normal/bright     LYMPHATICS: No cervical adenopathy    Recent Labs   Lab Test 03/17/21  1513 08/27/20  1546 05/27/20  1402 02/13/20  1240   HGB 14.5  --   --  15.7     --   --  295    138   < >  --    POTASSIUM 4.4 4.4   < >  --    CR 0.67 " 0.73   < >  --    A1C 7.9* 9.3*   < >  --     < > = values in this interval not displayed.        Diagnostics:  Labs pending at this time.  Results will be reviewed when available.   EKG: appears normal, NSR, unchanged from previous tracings    Revised Cardiac Risk Index (RCRI):  The patient has the following serious cardiovascular risks for perioperative complications:   - No serious cardiac risks = 0 points     RCRI Interpretation: 0 points: Class I (very low risk - 0.4% complication rate)      Signed Electronically by: Gayle Crowe MD  Copy of this evaluation report is provided to requesting physician.

## 2021-11-04 LAB
ANION GAP SERPL CALCULATED.3IONS-SCNC: 11 MMOL/L (ref 5–18)
ATRIAL RATE - MUSE: 90 BPM
BUN SERPL-MCNC: 11 MG/DL (ref 8–22)
CALCIUM SERPL-MCNC: 10.2 MG/DL (ref 8.5–10.5)
CHLORIDE BLD-SCNC: 102 MMOL/L (ref 98–107)
CO2 SERPL-SCNC: 25 MMOL/L (ref 22–31)
CREAT SERPL-MCNC: 0.69 MG/DL (ref 0.6–1.1)
DIASTOLIC BLOOD PRESSURE - MUSE: NORMAL MMHG
GFR SERPL CREATININE-BSD FRML MDRD: >90 ML/MIN/1.73M2
GLUCOSE BLD-MCNC: 114 MG/DL (ref 70–125)
INTERPRETATION ECG - MUSE: NORMAL
P AXIS - MUSE: 63 DEGREES
POTASSIUM BLD-SCNC: 3.8 MMOL/L (ref 3.5–5)
PR INTERVAL - MUSE: 182 MS
QRS DURATION - MUSE: 70 MS
QT - MUSE: 364 MS
QTC - MUSE: 445 MS
R AXIS - MUSE: -7 DEGREES
SODIUM SERPL-SCNC: 138 MMOL/L (ref 136–145)
SYSTOLIC BLOOD PRESSURE - MUSE: NORMAL MMHG
T AXIS - MUSE: 37 DEGREES
VENTRICULAR RATE- MUSE: 90 BPM

## 2022-01-01 ENCOUNTER — TRANSFERRED RECORDS (OUTPATIENT)
Dept: HEALTH INFORMATION MANAGEMENT | Facility: CLINIC | Age: 47
End: 2022-01-01
Payer: OTHER GOVERNMENT

## 2022-01-01 LAB — RETINOPATHY: NORMAL

## 2022-04-15 ENCOUNTER — OFFICE VISIT (OUTPATIENT)
Dept: INTERNAL MEDICINE | Facility: CLINIC | Age: 47
End: 2022-04-15
Payer: OTHER GOVERNMENT

## 2022-04-15 VITALS
DIASTOLIC BLOOD PRESSURE: 70 MMHG | HEIGHT: 61 IN | BODY MASS INDEX: 39.65 KG/M2 | SYSTOLIC BLOOD PRESSURE: 118 MMHG | OXYGEN SATURATION: 96 % | WEIGHT: 210 LBS | HEART RATE: 110 BPM

## 2022-04-15 DIAGNOSIS — E66.01 MORBID OBESITY (H): ICD-10-CM

## 2022-04-15 DIAGNOSIS — E11.69 TYPE 2 DIABETES MELLITUS WITH OTHER SPECIFIED COMPLICATION, WITHOUT LONG-TERM CURRENT USE OF INSULIN (H): ICD-10-CM

## 2022-04-15 DIAGNOSIS — E11.65 TYPE 2 DIABETES MELLITUS WITH HYPERGLYCEMIA, WITH LONG-TERM CURRENT USE OF INSULIN (H): ICD-10-CM

## 2022-04-15 DIAGNOSIS — Z72.0 TOBACCO ABUSE: ICD-10-CM

## 2022-04-15 DIAGNOSIS — E78.2 MIXED HYPERLIPIDEMIA: ICD-10-CM

## 2022-04-15 DIAGNOSIS — I10 BENIGN ESSENTIAL HYPERTENSION: ICD-10-CM

## 2022-04-15 DIAGNOSIS — Z79.4 TYPE 2 DIABETES MELLITUS WITH HYPERGLYCEMIA, WITH LONG-TERM CURRENT USE OF INSULIN (H): ICD-10-CM

## 2022-04-15 DIAGNOSIS — E11.29 TYPE 2 DIABETES MELLITUS WITH OTHER DIABETIC KIDNEY COMPLICATION (H): ICD-10-CM

## 2022-04-15 DIAGNOSIS — Z12.31 VISIT FOR SCREENING MAMMOGRAM: ICD-10-CM

## 2022-04-15 DIAGNOSIS — M48.062 SPINAL STENOSIS, LUMBAR REGION, WITH NEUROGENIC CLAUDICATION: ICD-10-CM

## 2022-04-15 DIAGNOSIS — E11.29 TYPE 2 DIABETES MELLITUS WITH OTHER DIABETIC KIDNEY COMPLICATION, WITHOUT LONG-TERM CURRENT USE OF INSULIN (H): Primary | ICD-10-CM

## 2022-04-15 LAB
ALBUMIN SERPL-MCNC: 3.9 G/DL (ref 3.5–5)
ALP SERPL-CCNC: 171 U/L (ref 45–120)
ALT SERPL W P-5'-P-CCNC: 51 U/L (ref 0–45)
ANION GAP SERPL CALCULATED.3IONS-SCNC: 12 MMOL/L (ref 5–18)
AST SERPL W P-5'-P-CCNC: 13 U/L (ref 0–40)
BILIRUB SERPL-MCNC: 0.2 MG/DL (ref 0–1)
BUN SERPL-MCNC: 22 MG/DL (ref 8–22)
CALCIUM SERPL-MCNC: 10.8 MG/DL (ref 8.5–10.5)
CHLORIDE BLD-SCNC: 102 MMOL/L (ref 98–107)
CHOLEST SERPL-MCNC: 289 MG/DL
CO2 SERPL-SCNC: 21 MMOL/L (ref 22–31)
CREAT SERPL-MCNC: 0.91 MG/DL (ref 0.6–1.1)
ERYTHROCYTE [DISTWIDTH] IN BLOOD BY AUTOMATED COUNT: 12.3 % (ref 10–15)
FASTING STATUS PATIENT QL REPORTED: YES
GFR SERPL CREATININE-BSD FRML MDRD: 77 ML/MIN/1.73M2
GLUCOSE BLD-MCNC: 325 MG/DL (ref 70–125)
HBA1C MFR BLD: 11.2 % (ref 0–5.6)
HCT VFR BLD AUTO: 46.3 % (ref 35–47)
HDLC SERPL-MCNC: 40 MG/DL
HGB BLD-MCNC: 15.8 G/DL (ref 11.7–15.7)
LDLC SERPL CALC-MCNC: 200 MG/DL
MCH RBC QN AUTO: 31.5 PG (ref 26.5–33)
MCHC RBC AUTO-ENTMCNC: 34.1 G/DL (ref 31.5–36.5)
MCV RBC AUTO: 92 FL (ref 78–100)
PLATELET # BLD AUTO: 320 10E3/UL (ref 150–450)
POTASSIUM BLD-SCNC: 4.5 MMOL/L (ref 3.5–5)
PROT SERPL-MCNC: 8 G/DL (ref 6–8)
RBC # BLD AUTO: 5.02 10E6/UL (ref 3.8–5.2)
SODIUM SERPL-SCNC: 135 MMOL/L (ref 136–145)
TRIGL SERPL-MCNC: 247 MG/DL
TSH SERPL DL<=0.005 MIU/L-ACNC: 1.45 UIU/ML (ref 0.3–5)
WBC # BLD AUTO: 10.6 10E3/UL (ref 4–11)

## 2022-04-15 PROCEDURE — 85027 COMPLETE CBC AUTOMATED: CPT | Performed by: INTERNAL MEDICINE

## 2022-04-15 PROCEDURE — 36415 COLL VENOUS BLD VENIPUNCTURE: CPT | Performed by: INTERNAL MEDICINE

## 2022-04-15 PROCEDURE — 99214 OFFICE O/P EST MOD 30 MIN: CPT | Performed by: INTERNAL MEDICINE

## 2022-04-15 PROCEDURE — 83036 HEMOGLOBIN GLYCOSYLATED A1C: CPT | Performed by: INTERNAL MEDICINE

## 2022-04-15 PROCEDURE — 80053 COMPREHEN METABOLIC PANEL: CPT | Performed by: INTERNAL MEDICINE

## 2022-04-15 PROCEDURE — 84443 ASSAY THYROID STIM HORMONE: CPT | Performed by: INTERNAL MEDICINE

## 2022-04-15 PROCEDURE — 80061 LIPID PANEL: CPT | Performed by: INTERNAL MEDICINE

## 2022-04-15 RX ORDER — ATORVASTATIN CALCIUM 20 MG/1
TABLET, FILM COATED ORAL
Qty: 90 TABLET | Refills: 3 | Status: SHIPPED | OUTPATIENT
Start: 2022-04-15 | End: 2023-07-13

## 2022-04-15 RX ORDER — LISINOPRIL 10 MG/1
TABLET ORAL
Qty: 90 TABLET | Refills: 3 | Status: SHIPPED | OUTPATIENT
Start: 2022-04-15 | End: 2023-07-13

## 2022-04-15 ASSESSMENT — PATIENT HEALTH QUESTIONNAIRE - PHQ9: SUM OF ALL RESPONSES TO PHQ QUESTIONS 1-9: 4

## 2022-04-15 NOTE — PATIENT INSTRUCTIONS
Follow-up for multiple issues since her previous visit of March 2021      Blood pressures well controlled, she told me that her blood sugars are running about 200  And this is despite escalating the Trulicity to the 1.5 mg dose.    Furthermore she told her that she feels unwell the Trulicity.  He makes her nauseated.    Today April 15, 2022 we will have her stop the Trulicity, and instead start one of the SGLT 2 oral pills, I will prescribe Jardiance 10 mg once a day (we could escalate dose to 25 mg).  I am not sure which of the SGLT is covered by her insurance, but will start with Jardiance.  I told her the other 2 medications are Invokana and Farxiga.    She is fasting this afternoon, so we will get a lipid panel, comprehensive metabolic panel, A1c, thyroid cascade, and blood cell counts     Type 2 diabetes, possible neuropathy complications with pain in her legs and numbness in her fingers, and decreased nylon monofilament sensation right great toe; microalbuminuria demonstrated February 13, 2020  Bothersome side effect from Trulicity, switching to Jardiance 10 mg a day April 15, 2022    11-3-2021  Hemoglobin A1C 0.0 - 5.6 % 7.8 High      She reports getting home blood sugar readings of around 200  I consider her A1c goal to be 7 or less.  She confirmed that she is taking metformin at the full dose of 1000 mg twice a day.    For microalbuminuria, she is on lisinopril 10 mg a day      She is due for annual eye exam so I transmitted that consultation request     Hyperlipidemia, on atorvastatin,  goal of LDL cholesterol less than 70.  3-   LDL Cholesterol Calculated <=129 mg/dL 66       Direct Measure HDL >=50 mg/dL 39 Low      Triglycerides <=149 mg/dL 149      Cigarette smoking, which definitely needs to stop in light of her diabetes.  She smokes approximately about a pack a day, and at that level of consumption, she may have a degree of physiologic nicotine dependence.      She has nicotine gum on hand,  which I prescribed for her back in August 2020.  Given her mental health conditions, I would not use Chantix.  If we do not use nicotine or Chantix, I think she is going to have to do this the old-fashioned way.     Obesity with complications of diabetes and hyperlipidemia, needs to eventually lose about 50 pounds or more.  She estimates that her weight is actually dropped by about 30 pounds over the last year.      Wt Readings from Last 5 Encounters:   04/15/22 95.3 kg (210 lb)   11/03/21 94.6 kg (208 lb 9.6 oz)   07/07/21 96.1 kg (211 lb 12.8 oz)   03/17/21 93.9 kg (207 lb)   08/27/20 93.7 kg (206 lb 8 oz)     Anxiety, depression, insomnia, which are under reasonably good control now on a rather complex medication regimen, currently working with [facility] nurse practitioner Corinne Deluna   Her medication regimen is a rather complex when the consistent for drugs that are managed at [facility]. These are aripiprazole, duloxetine, gabapentin, and mirtazapine. added Cogentin 0.5 mg at bedtime to her regimen, which she finds quite helpful.  She told me that the combination is working well.  She does not think that any of these medications contributed to weight gain.    Chronic low back pain, for which she was been working with [facility] and was told about lumbar spinal stenosis.  We have not received any documents from [facility], and it would be useful to see the results of their imaging studies, which she recalls includes a lumbar spine MRI.  She was told that she has noncritical lumbar spinal stenosis.  She did start physical therapy at [facility], and I told her that she needs to do the home exercises, which she is currently not doing.     I told her that the leg pain could be from lumbar spinal stenosis, or radicular symptoms (nerve root pain from bad lumbar disks).  But it could also represent diabetic neuropathy.  I asked her to ask [facility] to send us their  documents, even better yet she should get her own copy, and bring them here.     Chronic left knee pain, degenerative arthritis.    Left knee x-ray performed February 20, 2020 confirmed moderate primary osteoarthritis in all 3 compartments also a tiny effusion, but no fracture    Got a Synvisc injection June 2021    I suggested she use over-the-counter Voltaren gel (topical diclofenac, 2 or 3 times a day.  Should penetrate into the knee joint pretty well.  Minimal systemic absorption.    Consider colon screening    Needs routine GYN care--she would like to choose her own gynecologist    Received second dose of Pfizer COVID-19 vaccine May 20, 2021  I asked her to seriously consider getting a booster, and she promised me she will think about it

## 2022-04-15 NOTE — PROGRESS NOTES
Office Visit - Follow Up   Elissa Grajeda   46 year old female    Date of Visit: 4/15/2022    Chief Complaint   Patient presents with     Follow Up     Check up - feels Trulicity isn't working as well as Glipizide, also make her nausea        -------------------------------------------------------------------------------------------------------------------------  Assessment and Plan    Follow-up for multiple issues since her previous visit of March 2021      Blood pressures well controlled, she told me that her blood sugars are running about 200  And this is despite escalating the Trulicity to the 1.5 mg dose.    Furthermore she told her that she feels unwell the Trulicity.  He makes her nauseated.    Today April 15, 2022 we will have her stop the Trulicity, and instead start one of the SGLT 2 oral pills, I will prescribe Jardiance 10 mg once a day (we could escalate dose to 25 mg).  I am not sure which of the SGLT is covered by her insurance, but will start with Jardiance.  I told her the other 2 medications are Invokana and Farxiga.    She is fasting this afternoon, so we will get a lipid panel, comprehensive metabolic panel, A1c, thyroid cascade, and blood cell counts     Type 2 diabetes, possible neuropathy complications with pain in her legs and numbness in her fingers, and decreased nylon monofilament sensation right great toe; microalbuminuria demonstrated February 13, 2020  Bothersome side effect from Trulicity, switching to Jardiance 10 mg a day April 15, 2022    11-3-2021  Hemoglobin A1C 0.0 - 5.6 % 7.8 High      She reports getting home blood sugar readings of around 200  I consider her A1c goal to be 7 or less.  She confirmed that she is taking metformin at the full dose of 1000 mg twice a day.    For microalbuminuria, she is on lisinopril 10 mg a day      She is due for annual eye exam so I transmitted that consultation request     Hyperlipidemia, on atorvastatin,  goal of LDL cholesterol less than  70.  3-   LDL Cholesterol Calculated <=129 mg/dL 66       Direct Measure HDL >=50 mg/dL 39 Low      Triglycerides <=149 mg/dL 149      Cigarette smoking, which definitely needs to stop in light of her diabetes.  She smokes approximately about a pack a day, and at that level of consumption, she may have a degree of physiologic nicotine dependence.      She has nicotine gum on hand, which I prescribed for her back in August 2020.  Given her mental health conditions, I would not use Chantix.  If we do not use nicotine or Chantix, I think she is going to have to do this the old-fashioned way.     Obesity with complications of diabetes and hyperlipidemia, needs to eventually lose about 50 pounds or more.  She estimates that her weight is actually dropped by about 30 pounds over the last year.      Wt Readings from Last 5 Encounters:   04/15/22 95.3 kg (210 lb)   11/03/21 94.6 kg (208 lb 9.6 oz)   07/07/21 96.1 kg (211 lb 12.8 oz)   03/17/21 93.9 kg (207 lb)   08/27/20 93.7 kg (206 lb 8 oz)     Anxiety, depression, insomnia, which are under reasonably good control now on a rather complex medication regimen, currently working with [facility] nurse practitioner Corinne Deluna   Her medication regimen is a rather complex when the consistent for drugs that are managed at [facility]. These are aripiprazole, duloxetine, gabapentin, and mirtazapine. added Cogentin 0.5 mg at bedtime to her regimen, which she finds quite helpful.  She told me that the combination is working well.  She does not think that any of these medications contributed to weight gain.    Chronic low back pain, for which she was been working with [facility] and was told about lumbar spinal stenosis.  We have not received any documents from [facility], and it would be useful to see the results of their imaging studies, which she recalls includes a lumbar spine MRI.  She was told that she has noncritical lumbar spinal stenosis.   She did start physical therapy at [facility], and I told her that she needs to do the home exercises, which she is currently not doing.     I told her that the leg pain could be from lumbar spinal stenosis, or radicular symptoms (nerve root pain from bad lumbar disks).  But it could also represent diabetic neuropathy.  I asked her to ask [facility] to send us their documents, even better yet she should get her own copy, and bring them here.     Chronic left knee pain, degenerative arthritis.    Left knee x-ray performed February 20, 2020 confirmed moderate primary osteoarthritis in all 3 compartments also a tiny effusion, but no fracture    Got a Synvisc injection June 2021    I suggested she use over-the-counter Voltaren gel (topical diclofenac, 2 or 3 times a day.  Should penetrate into the knee joint pretty well.  Minimal systemic absorption.    Consider colon screening    Needs routine GYN care--she would like to choose her own gynecologist    Received second dose of Pfizer COVID-19 vaccine May 20, 2021  I asked her to seriously consider getting a booster, and she promised me she will think about it       --------------------------------------------------------------------------------------------------------------------------  History of Present Illness  This 46 year old old     Follow-up for multiple issues since her previous visit of March 2021      Blood pressures well controlled, she told me that her blood sugars are running about 200  And this is despite escalating the Trulicity to the 1.5 mg dose.    Furthermore she told her that she feels unwell the Trulicity.  He makes her nauseated.    Today April 15, 2022 we will have her stop the Trulicity, and instead start one of the SGLT 2 oral pills, I will prescribe Jardiance 10 mg once a day (we could escalate dose to 25 mg).  I am not sure which of the SGLT is covered by her insurance, but will start with Jardiance.  I told her the other 2  medications are Invokana and Farxiga.    She is fasting this afternoon, so we will get a lipid panel, comprehensive metabolic panel, A1c, thyroid cascade, and blood cell counts      Wt Readings from Last 3 Encounters:   04/15/22 95.3 kg (210 lb)   11/03/21 94.6 kg (208 lb 9.6 oz)   07/07/21 96.1 kg (211 lb 12.8 oz)     BP Readings from Last 3 Encounters:   04/15/22 118/70   11/03/21 112/76   07/07/21 126/60         Review of Systems: A comprehensive review of systems was negative except as noted.  ---------------------------------------------------------------------------------------------------------------------------    Medications, Allergies, Social, and Problem List   Current Outpatient Medications   Medication Sig Dispense Refill     ACCU-CHEK GUIDE TEST STRIPS strips [ACCU-CHEK GUIDE TEST STRIPS STRIPS] USE TO TEST TWICE DAILY 200 strip 3     ARIPiprazole (ABILIFY) 10 MG tablet [ARIPIPRAZOLE (ABILIFY) 10 MG TABLET] Take 10 mg by mouth daily.        atorvastatin (LIPITOR) 20 MG tablet [ATORVASTATIN (LIPITOR) 20 MG TABLET] TAKE 1 TABLET BY MOUTH EVERY DAY 90 tablet 3     benztropine (COGENTIN) 0.5 MG tablet [BENZTROPINE (COGENTIN) 0.5 MG TABLET] TAKE 1 TABLET BY MOUTH EVERYDAY AT BEDTIME       blood glucose meter (GLUCOMETER) [BLOOD GLUCOSE METER (GLUCOMETER)] Test TWICE daily. Dispense glucometer brand per patient's insurance at pharmacy discretion. 1 each 0     dulaglutide (TRULICITY) 1.5 mg/0.5 mL PnIj [DULAGLUTIDE (TRULICITY) 1.5 MG/0.5 ML PNIJ] Inject 1.5 mg under the skin every 7 days. 12 Syringe 3     DULoxetine (CYMBALTA) 60 MG capsule [DULOXETINE (CYMBALTA) 60 MG CAPSULE] Take 120 mg by mouth daily.        gabapentin (NEURONTIN) 300 MG capsule [GABAPENTIN (NEURONTIN) 300 MG CAPSULE] 600 mg 2 (two) times a day.  2     generic lancets (FINGERSTIX LANCETS) [GENERIC LANCETS (FINGERSTIX LANCETS)] Test TWICE daily. Dispense brand per patient's insurance at pharmacy discretion. 100 each 11     ketoconazole  "(NIZORAL) 2 % cream [KETOCONAZOLE (NIZORAL) 2 % CREAM] Apply sparingly to rash two times a day 30 g 0     lisinopriL (PRINIVIL,ZESTRIL) 10 MG tablet [LISINOPRIL (PRINIVIL,ZESTRIL) 10 MG TABLET] TAKE 1 TABLET BY MOUTH EVERY DAY 90 tablet 3     metFORMIN (GLUCOPHAGE) 1000 MG tablet [METFORMIN (GLUCOPHAGE) 1000 MG TABLET] TAKE 1 TABLET BY MOUTH TWICE A DAY WITH MEALS 180 tablet 3     mirtazapine (REMERON) 15 MG tablet [MIRTAZAPINE (REMERON) 15 MG TABLET] TAKE 1 TABLET BY MOUTH EVERYDAY AT BEDTIME       triamcinolone (KENALOG) 0.1 % ointment [TRIAMCINOLONE (KENALOG) 0.1 % OINTMENT] Apply sparingly to the rash 2-3 times a day 30 g 0     No Known Allergies  Social History     Tobacco Use     Smoking status: Current Every Day Smoker     Packs/day: 1.00     Years: 20.00     Pack years: 20.00     Types: Cigarettes     Smokeless tobacco: Never Used     Patient Active Problem List   Diagnosis     Type 2 diabetes mellitus (H)     Anxiety     Insomnia     Hyperlipidemia     Tobacco abuse     Obesity     Microalbuminuria due to type 2 diabetes mellitus (H)     Benign essential hypertension     Spinal stenosis, lumbar region, with neurogenic claudication     Osteoarthritis of left knee, unspecified osteoarthritis type-- xray Feb 2020     Obesity (BMI 35.0-39.9) with comorbidity (H)        Reviewed, reconciled and updated       Physical Exam   General Appearance:      /70 (BP Location: Right arm, Patient Position: Sitting, Cuff Size: Adult Regular)   Pulse 110   Ht 1.549 m (5' 1\")   Wt 95.3 kg (210 lb)   SpO2 96%   BMI 39.68 kg/m      General: Alert, in no distress  Obese  Skin: No significant lesion seen.  Eyes/nose/throat: Eyes without scleral icterus, eye movements normal, pupils equal and reactive, oropharynx clear, ears with normal TM's  MSK: Neck with good ROM  Lymphatic: Neck without adenopathy or masses  Endocrine: Thyroid with no nodules to palpation  Pulm: Lungs clear to auscultation bilaterally  Cardiac: Heart " with regular rate and rhythm, no murmur or gallop  GI: Abdomen soft, nontender. No palpable enlargement of liver or spleen  MSK: Extremities no tenderness or edema  Neuro: Moves all extremities, without focal weakness  Psych: Alert, normal mental status. Normal affect and speech  + Diabetic foot exam with good tissue integrity, no edema, intact pulses, slightly reduced sensation to nylon monofilament testing right great toe     Additional Information        DENISA NAVARRO MD, MD  Answers for HPI/ROS submitted by the patient on 4/12/2022  Frequency of checking blood sugars:: not at all  Diabetic concerns:: none  Paraesthesia present:: none of these symptoms  Have you had a diabetic eye exam within the last year?: Yes  Date of last eye exam:: January 2022  Where was this eye exam done?: [facility]  How many servings of fruits and vegetables do you eat daily?: 2-3  On average, how many sweetened beverages do you drink each day (Examples: soda, juice, sweet tea, etc.  Do NOT count diet or artificially sweetened beverages)?: 0  How many minutes a day do you exercise enough to make your heart beat faster?: 30 to 60  How many days a week do you exercise enough to make your heart beat faster?: 4  How many days per week do you miss taking your medication?: 2  What makes it hard for you to take your medication every day?: remembering to take

## 2022-04-22 DIAGNOSIS — E11.29 TYPE 2 DIABETES MELLITUS WITH OTHER DIABETIC KIDNEY COMPLICATION, WITHOUT LONG-TERM CURRENT USE OF INSULIN (H): ICD-10-CM

## 2022-10-01 ENCOUNTER — HEALTH MAINTENANCE LETTER (OUTPATIENT)
Age: 47
End: 2022-10-01

## 2022-11-10 ENCOUNTER — TRANSFERRED RECORDS (OUTPATIENT)
Dept: MULTI SPECIALTY CLINIC | Facility: CLINIC | Age: 47
End: 2022-11-10

## 2022-11-10 LAB — RETINOPATHY: NORMAL

## 2023-02-05 ENCOUNTER — HEALTH MAINTENANCE LETTER (OUTPATIENT)
Age: 48
End: 2023-02-05

## 2023-07-13 ENCOUNTER — LAB (OUTPATIENT)
Dept: INTERNAL MEDICINE | Facility: CLINIC | Age: 48
End: 2023-07-13

## 2023-07-13 ENCOUNTER — OFFICE VISIT (OUTPATIENT)
Dept: INTERNAL MEDICINE | Facility: CLINIC | Age: 48
End: 2023-07-13
Payer: OTHER GOVERNMENT

## 2023-07-13 VITALS
RESPIRATION RATE: 16 BRPM | HEART RATE: 89 BPM | SYSTOLIC BLOOD PRESSURE: 122 MMHG | DIASTOLIC BLOOD PRESSURE: 68 MMHG | OXYGEN SATURATION: 95 % | HEIGHT: 61 IN | TEMPERATURE: 98.6 F | WEIGHT: 209 LBS | BODY MASS INDEX: 39.46 KG/M2

## 2023-07-13 DIAGNOSIS — Z12.11 SCREEN FOR COLON CANCER: ICD-10-CM

## 2023-07-13 DIAGNOSIS — E11.65 POORLY CONTROLLED TYPE 2 DIABETES MELLITUS (H): Primary | ICD-10-CM

## 2023-07-13 DIAGNOSIS — E78.2 MIXED HYPERLIPIDEMIA: ICD-10-CM

## 2023-07-13 DIAGNOSIS — Z00.00 ROUTINE GENERAL MEDICAL EXAMINATION AT A HEALTH CARE FACILITY: ICD-10-CM

## 2023-07-13 DIAGNOSIS — R80.9 MICROALBUMINURIA DUE TO TYPE 2 DIABETES MELLITUS (H): ICD-10-CM

## 2023-07-13 DIAGNOSIS — I10 BENIGN ESSENTIAL HYPERTENSION: ICD-10-CM

## 2023-07-13 DIAGNOSIS — Z12.31 VISIT FOR SCREENING MAMMOGRAM: ICD-10-CM

## 2023-07-13 DIAGNOSIS — E11.29 TYPE 2 DIABETES MELLITUS WITH OTHER DIABETIC KIDNEY COMPLICATION, WITHOUT LONG-TERM CURRENT USE OF INSULIN (H): ICD-10-CM

## 2023-07-13 DIAGNOSIS — E11.29 MICROALBUMINURIA DUE TO TYPE 2 DIABETES MELLITUS (H): ICD-10-CM

## 2023-07-13 LAB
ALBUMIN SERPL BCG-MCNC: 4.6 G/DL (ref 3.5–5.2)
ALP SERPL-CCNC: 123 U/L (ref 35–104)
ALT SERPL W P-5'-P-CCNC: 23 U/L (ref 0–50)
ANION GAP SERPL CALCULATED.3IONS-SCNC: 10 MMOL/L (ref 7–15)
AST SERPL W P-5'-P-CCNC: 21 U/L (ref 0–45)
BILIRUB SERPL-MCNC: 0.3 MG/DL
BUN SERPL-MCNC: 24.2 MG/DL (ref 6–20)
CALCIUM SERPL-MCNC: 10.3 MG/DL (ref 8.6–10)
CHLORIDE SERPL-SCNC: 102 MMOL/L (ref 98–107)
CHOLEST SERPL-MCNC: 265 MG/DL
CREAT SERPL-MCNC: 0.58 MG/DL (ref 0.51–0.95)
DEPRECATED HCO3 PLAS-SCNC: 25 MMOL/L (ref 22–29)
ERYTHROCYTE [DISTWIDTH] IN BLOOD BY AUTOMATED COUNT: 12.9 % (ref 10–15)
GFR SERPL CREATININE-BSD FRML MDRD: >90 ML/MIN/1.73M2
GLUCOSE SERPL-MCNC: 225 MG/DL (ref 70–99)
HBA1C MFR BLD: 12.2 % (ref 0–5.6)
HCT VFR BLD AUTO: 47.9 % (ref 35–47)
HDLC SERPL-MCNC: 38 MG/DL
HGB BLD-MCNC: 15.8 G/DL (ref 11.7–15.7)
LDLC SERPL CALC-MCNC: 170 MG/DL
MCH RBC QN AUTO: 31.1 PG (ref 26.5–33)
MCHC RBC AUTO-ENTMCNC: 33 G/DL (ref 31.5–36.5)
MCV RBC AUTO: 94 FL (ref 78–100)
NONHDLC SERPL-MCNC: 227 MG/DL
PLATELET # BLD AUTO: 278 10E3/UL (ref 150–450)
POTASSIUM SERPL-SCNC: 4.6 MMOL/L (ref 3.4–5.3)
PROT SERPL-MCNC: 7.8 G/DL (ref 6.4–8.3)
RBC # BLD AUTO: 5.08 10E6/UL (ref 3.8–5.2)
SODIUM SERPL-SCNC: 137 MMOL/L (ref 136–145)
TRIGL SERPL-MCNC: 287 MG/DL
TSH SERPL DL<=0.005 MIU/L-ACNC: 3.01 UIU/ML (ref 0.3–4.2)
WBC # BLD AUTO: 8.9 10E3/UL (ref 4–11)

## 2023-07-13 PROCEDURE — 85027 COMPLETE CBC AUTOMATED: CPT | Performed by: INTERNAL MEDICINE

## 2023-07-13 PROCEDURE — 36415 COLL VENOUS BLD VENIPUNCTURE: CPT | Performed by: INTERNAL MEDICINE

## 2023-07-13 PROCEDURE — 80061 LIPID PANEL: CPT | Performed by: INTERNAL MEDICINE

## 2023-07-13 PROCEDURE — 84443 ASSAY THYROID STIM HORMONE: CPT | Performed by: INTERNAL MEDICINE

## 2023-07-13 PROCEDURE — 99215 OFFICE O/P EST HI 40 MIN: CPT | Performed by: INTERNAL MEDICINE

## 2023-07-13 PROCEDURE — 83036 HEMOGLOBIN GLYCOSYLATED A1C: CPT | Performed by: INTERNAL MEDICINE

## 2023-07-13 PROCEDURE — 80053 COMPREHEN METABOLIC PANEL: CPT | Performed by: INTERNAL MEDICINE

## 2023-07-13 RX ORDER — LISINOPRIL 10 MG/1
TABLET ORAL
Qty: 90 TABLET | Refills: 3 | Status: SHIPPED | OUTPATIENT
Start: 2023-07-13

## 2023-07-13 RX ORDER — ATORVASTATIN CALCIUM 20 MG/1
TABLET, FILM COATED ORAL
Qty: 90 TABLET | Refills: 3 | Status: SHIPPED | OUTPATIENT
Start: 2023-07-13

## 2023-07-13 ASSESSMENT — ANXIETY QUESTIONNAIRES
4. TROUBLE RELAXING: SEVERAL DAYS
1. FEELING NERVOUS, ANXIOUS, OR ON EDGE: SEVERAL DAYS
GAD7 TOTAL SCORE: 7
6. BECOMING EASILY ANNOYED OR IRRITABLE: SEVERAL DAYS
3. WORRYING TOO MUCH ABOUT DIFFERENT THINGS: SEVERAL DAYS
2. NOT BEING ABLE TO STOP OR CONTROL WORRYING: NOT AT ALL
GAD7 TOTAL SCORE: 7
7. FEELING AFRAID AS IF SOMETHING AWFUL MIGHT HAPPEN: NOT AT ALL
IF YOU CHECKED OFF ANY PROBLEMS ON THIS QUESTIONNAIRE, HOW DIFFICULT HAVE THESE PROBLEMS MADE IT FOR YOU TO DO YOUR WORK, TAKE CARE OF THINGS AT HOME, OR GET ALONG WITH OTHER PEOPLE: SOMEWHAT DIFFICULT
5. BEING SO RESTLESS THAT IT IS HARD TO SIT STILL: NEARLY EVERY DAY

## 2023-07-13 ASSESSMENT — PATIENT HEALTH QUESTIONNAIRE - PHQ9
SUM OF ALL RESPONSES TO PHQ QUESTIONS 1-9: 4
10. IF YOU CHECKED OFF ANY PROBLEMS, HOW DIFFICULT HAVE THESE PROBLEMS MADE IT FOR YOU TO DO YOUR WORK, TAKE CARE OF THINGS AT HOME, OR GET ALONG WITH OTHER PEOPLE: SOMEWHAT DIFFICULT
SUM OF ALL RESPONSES TO PHQ QUESTIONS 1-9: 4

## 2023-07-13 NOTE — PROGRESS NOTES
Office Visit - Follow Up   Elissa Grajeda   47 year old female    Date of Visit: 7/13/2023    Chief Complaint   Patient presents with     Diabetes     fasting     Hypertension        -------------------------------------------------------------------------------------------------------------------------  Assessment and Plan    ADDENDUM:  A1c 12.2, diabetes badly out-of-control.  I sent her a [employer] message telling her that I would get the diabetes education team involved, because she likely needs to add insulin, but for the time being continue on metformin plus Jardiance.    Lipids not controlled either.  I urged her to get restarted on atorvastatin.    Follow-up for multiple issues since her previous visit of April 2022  Blood pressures well controlled, she told me that her blood sugars are running about 200  And this is despite escalating the Trulicity to the 1.5 mg dose.    Works in [employer] service     Furthermore she told her that she feels unwell the Trulicity.  He makes her nauseated.     April 15, 2022 stopped Trulicity started SGLT2  Jardiance 10 mg once a day     She is fasting this morning, so we will get a lipid panel, comprehensive metabolic panel, A1c, thyroid cascade, and blood cell counts    Type 2 diabetes, possible mild neuropathy decreased nylon monofilament sensation right great toe; microalbuminuria demonstrated February 13, 2020    Bothersome side effect from Trulicity, switched to Jardiance 10 mg a day April 15, 2022  Continues Metformin 100 mg twice a day    Very high A1c in April 2022, but perhaps was not consistent with medicines-- had stopped Trulicity  4-  Hemoglobin A1C 0.0 - 5.6 % 11.2 High      I consider her A1c goal to be 7 or less.  She confirmed that she is taking metformin at the full dose of 1000 mg twice a day.     For microalbuminuria, she is on lisinopril 10 mg a day       Eye exam so said was November 2022     Hyperlipidemia, on atorvastatin,  goal of  LDL cholesterol less than 70.    4- (was not taking her meds)  Triglycerides <=149 mg/dL 247 High      Direct Measure HDL >=50 mg/dL 40 Low      Calculated <=129 mg/dL 200      Elevated liver enzymes   4-  Alkaline Phosphatase 45 - 120 U/L 171 High      ALT 0 - 45 U/L 51 High      AST 0 - 40 U/L 13      Cigarette smoking, which definitely needs to stop in light of her diabetes.  She smokes approximately about a pack a day, and at that level of consumption, she may have a degree of physiologic nicotine dependence.      She has nicotine gum on hand, which I prescribed for her back in August 2020.  Given her mental health conditions, I would not use Chantix.  If we do not use nicotine or Chantix, I think she is going to have to do this the old-fashioned way.     Obesity with complications of diabetes and hyperlipidemia, needs to eventually lose about 50 pounds or more.  She estimates that her weight is actually dropped by about 30 pounds over the last year.      Wt Readings from Last 5 Encounters:   07/13/23 94.8 kg (209 lb)   04/15/22 95.3 kg (210 lb)   11/03/21 94.6 kg (208 lb 9.6 oz)   07/07/21 96.1 kg (211 lb 12.8 oz)   03/17/21 93.9 kg (207 lb)     Anxiety, depression, insomnia, which are under reasonably good control now on a rather complex medication regimen, currently working with [facility] nurse practitioner Alissa Ramirez  Her medication regimen is a rather complex when the consistent for drugs that are managed at [facility]. These are aripiprazole, duloxetine, gabapentin, and mirtazapine. added Cogentin 0.5 mg at bedtime to her regimen, which she finds quite helpful.  She told me that the combination is working well.  She does not think that any of these medications contributed to weight gain.     Chronic low back pain, for which she was been working with [facility] and was told about lumbar spinal stenosis.  We have not received any documents from [facility], and  it would be useful to see the results of their imaging studies, which she recalls includes a lumbar spine MRI.  She was told that she has noncritical lumbar spinal stenosis.  She did start physical therapy at [facility], and I told her that she needs to do the home exercises, which she is currently not doing.     I told her that the leg pain could be from lumbar spinal stenosis, or radicular symptoms (nerve root pain from bad lumbar disks).  But it could also represent diabetic neuropathy.  I asked her to ask [facility] to send us their documents, even better yet she should get her own copy, and bring them here.     Chronic left knee pain, degenerative arthritis  Also right knee  Left knee x-ray performed February 20, 2020 confirmed moderate primary osteoarthritis in all 3 compartments also a tiny effusion, but no fracture     I suggested she use over-the-counter Voltaren gel (topical diclofenac, 2 or 3 times a day.  Should penetrate into the knee joint pretty well.  Minimal systemic absorption.     Colon screening-- Cologard     Needs routine GYN care--she would like to choose her own gynecologist  I will order a mammogram    Received second dose of Pfizer COVID-19 vaccine May 20, 2021  I asked her to seriously consider getting a booster, and she promised me she will think about it     Immunization History   Administered Date(s) Administered     COVID-19 MONOVALENT 12+ (Pfizer) 04/29/2021, 05/20/2021     Influenza Vaccine >6 months (Alfuria,Fluzone) 02/13/2020     Influenza Vaccine, 6+MO IM (QUADRIVALENT W/PRESERVATIVES) 10/02/2014     TDAP (Adacel,Boostrix) 05/15/2012     Td,adult,historic,unspecified 05/15/2012         --------------------------------------------------------------------------------------------------------------------------  History of Present Illness  This 47 year old old          Mental Health Follow-up:  Patient presents to follow-up on Anxiety.     Patient's anxiety since last  visit has been:  Medium  The patient is not having other symptoms associated with anxiety.  Any significant life events: No  Patient is feeling anxious or having panic attacks.  Patient has no concerns about alcohol or drug use.     Diabetes:   She presents for follow up of diabetes.  She is checking home blood glucose one time daily. She checks blood glucose before and after meals.  Blood glucose is never over 200 and never under 70. She is aware of hypoglycemia symptoms including dizziness. She has no concerns regarding her diabetes at this time.  She is not experiencing numbness or burning in feet, excessive thirst, blurry vision, weight changes or redness, sores or blisters on feet. The patient has had a diabetic eye exam in the last 12 months. Eye exam performed on 11/10/22. [facility]           She eats 0-1 servings of fruits and vegetables daily.She consumes 1 sweetened beverage(s) daily.She exercises with enough effort to increase her heart rate 10 to 19 minutes per day.  She exercises with enough effort to increase her heart rate 3 or less days per week.      Today's PHQ-9         PHQ-9 Total Score: 4     PHQ-9 Q9 Thoughts of better off dead/self-harm past 2 weeks :   Not at all     How difficult have these problems made it for you to do your work, take care of things at home, or get along with other people: Somewhat difficult  Today's GUADALUPE-7 Score: 7       Wt Readings from Last 3 Encounters:   07/13/23 94.8 kg (209 lb)   04/15/22 95.3 kg (210 lb)   11/03/21 94.6 kg (208 lb 9.6 oz)     BP Readings from Last 3 Encounters:   07/13/23 122/68   04/15/22 118/70   11/03/21 112/76       Lab Results   Component Value Date    WBC 10.6 04/15/2022    HGB 15.8 (H) 04/15/2022    HCT 46.3 04/15/2022     04/15/2022    CHOL 289 (H) 04/15/2022    TRIG 247 (H) 04/15/2022    HDL 40 (L) 04/15/2022    ALT 51 (H) 04/15/2022    AST 13 04/15/2022     (L) 04/15/2022    BUN 22 04/15/2022     CO2 21 (L) 04/15/2022    TSH 1.45 04/15/2022    MICROALBUR 2.67 (H) 02/13/2020        Review of Systems: A comprehensive review of systems was negative except as noted.  ---------------------------------------------------------------------------------------------------------------------------    Medications, Allergies, Social, and Problem List       Current Outpatient Medications   Medication Sig Dispense Refill     ACCU-CHEK GUIDE TEST STRIPS strips [ACCU-CHEK GUIDE TEST STRIPS STRIPS] USE TO TEST TWICE DAILY 200 strip 3     ARIPiprazole (ABILIFY) 10 MG tablet [ARIPIPRAZOLE (ABILIFY) 10 MG TABLET] Take 10 mg by mouth daily.        atorvastatin (LIPITOR) 20 MG tablet [ATORVASTATIN (LIPITOR) 20 MG TABLET] TAKE 1 TABLET BY MOUTH EVERY DAY 90 tablet 3     benztropine (COGENTIN) 0.5 MG tablet [BENZTROPINE (COGENTIN) 0.5 MG TABLET] TAKE 1 TABLET BY MOUTH EVERYDAY AT BEDTIME       blood glucose meter (GLUCOMETER) [BLOOD GLUCOSE METER (GLUCOMETER)] Test TWICE daily. Dispense glucometer brand per patient's insurance at pharmacy discretion. 1 each 0     DULoxetine (CYMBALTA) 60 MG capsule [DULOXETINE (CYMBALTA) 60 MG CAPSULE] Take 120 mg by mouth daily.        empagliflozin (JARDIANCE) 10 MG TABS tablet Take 1 tablet (10 mg) by mouth daily 90 tablet 3     gabapentin (NEURONTIN) 300 MG capsule [GABAPENTIN (NEURONTIN) 300 MG CAPSULE] 600 mg 2 (two) times a day.  2     generic lancets (FINGERSTIX LANCETS) [GENERIC LANCETS (FINGERSTIX LANCETS)] Test TWICE daily. Dispense brand per patient's insurance at pharmacy discretion. 100 each 11     ketoconazole (NIZORAL) 2 % cream [KETOCONAZOLE (NIZORAL) 2 % CREAM] Apply sparingly to rash two times a day 30 g 0     lisinopril (ZESTRIL) 10 MG tablet [LISINOPRIL (PRINIVIL,ZESTRIL) 10 MG TABLET] TAKE 1 TABLET BY MOUTH EVERY DAY 90 tablet 3     metFORMIN (GLUCOPHAGE) 1000 MG tablet [METFORMIN (GLUCOPHAGE) 1000 MG TABLET] TAKE 1 TABLET BY MOUTH TWICE A DAY WITH MEALS 180 tablet 3      "mirtazapine (REMERON) 15 MG tablet [MIRTAZAPINE (REMERON) 15 MG TABLET] TAKE 1 TABLET BY MOUTH EVERYDAY AT BEDTIME       insulin glargine (LANTUS PEN) 100 UNIT/ML pen Inject 10-20 Units Subcutaneous At Bedtime Start with 10 units once a day, further adjustments by Diabetes Team 15 mL 4     insulin pen needle (31G X 5 MM) 31G X 5 MM miscellaneous Use 1 pen needles daily or as directed. 100 each 3     triamcinolone (KENALOG) 0.1 % ointment [TRIAMCINOLONE (KENALOG) 0.1 % OINTMENT] Apply sparingly to the rash 2-3 times a day (Patient not taking: Reported on 7/13/2023) 30 g 0     No Known Allergies  Social History     Tobacco Use     Smoking status: Every Day     Packs/day: 1.00     Years: 20.00     Pack years: 20.00     Types: Cigarettes     Smokeless tobacco: Never     Patient Active Problem List   Diagnosis     Type 2 diabetes mellitus with hyperglycemia, with long-term current use of insulin (H)     Anxiety     Insomnia     Hyperlipidemia     Tobacco abuse     Obesity     Microalbuminuria due to type 2 diabetes mellitus (H)     Benign essential hypertension     Spinal stenosis, lumbar region, with neurogenic claudication     Osteoarthritis of left knee, unspecified osteoarthritis type-- xray Feb 2020     Obesity (BMI 35.0-39.9) with comorbidity (H)        Reviewed, reconciled and updated       Physical Exam   General Appearance:       /68 (BP Location: Right arm, Patient Position: Sitting, Cuff Size: Adult Regular)   Pulse 89   Temp 98.6  F (37  C)   Resp 16   Ht 1.549 m (5' 1\")   Wt 94.8 kg (209 lb)   SpO2 95%   BMI 39.49 kg/m      General: Alert, in no distress  Skin: No significant lesion seen.  Eyes/nose/throat: Eyes without scleral icterus, eye movements normal, pupils equal and reactive, oropharynx clear, ears with normal TM's  MSK: Neck with good ROM  Lymphatic: Neck without adenopathy or masses  Endocrine: Thyroid with no nodules to palpation  Pulm: Lungs clear to auscultation " bilaterally  Cardiac: Heart with regular rate and rhythm, no murmur or gallop  GI: Abdomen + central abdominal obesity, soft, nontender. No palpable enlargement of liver or spleen  MSK: Extremities no tenderness or edema  Neuro: Moves all extremities, without focal weakness  Psych: Alert, normal mental status. Normal affect and speech    Diabetic foot exam notable for good tissue integrity and pulses.  Mildly reduced sensation to nylon monofilament testing right great toe       Additional Information   I spent 40 minutes on this encounter, including reviewing interval history since last visit, examining the patient, explaining and counseling the issues enumerated in the Assessment and Plan (patient given a copy), ordering indicated tests, ordering prescriptions, ordering referrals.       DENISA NAVARRO MD, MD          Answers for HPI/ROS submitted by the patient on 7/13/2023  If you checked off any problems, how difficult have these problems made it for you to do your work, take care of things at home, or get along with other people?: Somewhat difficult  PHQ9 TOTAL SCORE: 4  GUADALUPE 7 TOTAL SCORE: 7  Frequency of checking blood sugars:: one time daily  What time of day are you checking your blood sugars : before and after meals  Have you had any blood sugars above 200?: No  Have you had any blood sugars below 70?: No  Hypoglycemia symptoms:: dizziness  Diabetic concerns:: none  Paraesthesia present:: none of these symptoms  Have you had a diabetic eye exam within the last year?: Yes  Date of last eye exam: : 11/10/22  Where [facility]  Depression/Anxiety: Anxiety  Anxiety since last: : medium  Other associated symotome: : No  Significant life event: : No  Anxious:: Yes  Current substance use:: No  How many servings of fruits and vegetables do you eat daily?: 0-1  On average, how many sweetened beverages do you drink each day (Examples: soda, juice, sweet tea, etc.  Do NOT count diet or  artificially sweetened beverages)?: 1  How many minutes a day do you exercise enough to make your heart beat faster?: 10 to 19  How many days a week do you exercise enough to make your heart beat faster?: 3 or less

## 2023-07-13 NOTE — PATIENT INSTRUCTIONS
Follow-up for multiple issues since her previous visit of April 2022  Blood pressures well controlled, she told me that her blood sugars are running about 200  And this is despite escalating the Trulicity to the 1.5 mg dose.    Works in [employer] service     Furthermore she told her that she feels unwell the Trulicity.  He makes her nauseated.     April 15, 2022 stopped Trulicity started SGLT2  Jardiance 10 mg once a day     She is fasting this morning, so we will get a lipid panel, comprehensive metabolic panel, A1c, thyroid cascade, and blood cell counts    Type 2 diabetes, possible mild neuropathy decreased nylon monofilament sensation right great toe; microalbuminuria demonstrated February 13, 2020    Bothersome side effect from Trulicity, switched to Jardiance 10 mg a day April 15, 2022  Continues Metformin 100 mg twice a day    Very high A1c in April 2022, but perhaps was not consistent with medicines-- had stopped Trulicity  4-  Hemoglobin A1C 0.0 - 5.6 % 11.2 High      I consider her A1c goal to be 7 or less.  She confirmed that she is taking metformin at the full dose of 1000 mg twice a day.     For microalbuminuria, she is on lisinopril 10 mg a day       Eye exam so said was November 2022     Hyperlipidemia, on atorvastatin,  goal of LDL cholesterol less than 70.    4- (was not taking her meds)  Triglycerides <=149 mg/dL 247 High      Direct Measure HDL >=50 mg/dL 40 Low      Calculated <=129 mg/dL 200      Elevated liver enzymes   4-  Alkaline Phosphatase 45 - 120 U/L 171 High      ALT 0 - 45 U/L 51 High      AST 0 - 40 U/L 13      Cigarette smoking, which definitely needs to stop in light of her diabetes.  She smokes approximately about a pack a day, and at that level of consumption, she may have a degree of physiologic nicotine dependence.      She has nicotine gum on hand, which I prescribed for her back in August 2020.  Given her mental health conditions, I would not use  Chantix.  If we do not use nicotine or Chantix, I think she is going to have to do this the old-fashioned way.     Obesity with complications of diabetes and hyperlipidemia, needs to eventually lose about 50 pounds or more.  She estimates that her weight is actually dropped by about 30 pounds over the last year.      Wt Readings from Last 5 Encounters:   07/13/23 94.8 kg (209 lb)   04/15/22 95.3 kg (210 lb)   11/03/21 94.6 kg (208 lb 9.6 oz)   07/07/21 96.1 kg (211 lb 12.8 oz)   03/17/21 93.9 kg (207 lb)     Anxiety, depression, insomnia, which are under reasonably good control now on a rather complex medication regimen, currently working with [facility] nurse practitioner Alissa Ramirez  Her medication regimen is a rather complex when the consistent for drugs that are managed at [facility]. These are aripiprazole, duloxetine, gabapentin, and mirtazapine. added Cogentin 0.5 mg at bedtime to her regimen, which she finds quite helpful.  She told me that the combination is working well.  She does not think that any of these medications contributed to weight gain.     Chronic low back pain, for which she was been working with [facility] and was told about lumbar spinal stenosis.  We have not received any documents from [facility], and it would be useful to see the results of their imaging studies, which she recalls includes a lumbar spine MRI.  She was told that she has noncritical lumbar spinal stenosis.  She did start physical therapy at [facility], and I told her that she needs to do the home exercises, which she is currently not doing.     I told her that the leg pain could be from lumbar spinal stenosis, or radicular symptoms (nerve root pain from bad lumbar disks).  But it could also represent diabetic neuropathy.  I asked her to ask [facility] to send us their documents, even better yet she should get her own copy, and bring them here.     Chronic left knee pain,  degenerative arthritis  Also right knee  Left knee x-ray performed February 20, 2020 confirmed moderate primary osteoarthritis in all 3 compartments also a tiny effusion, but no fracture     I suggested she use over-the-counter Voltaren gel (topical diclofenac, 2 or 3 times a day.  Should penetrate into the knee joint pretty well.  Minimal systemic absorption.     Colon screening-- Cologard     Needs routine GYN care--she would like to choose her own gynecologist  I will order a mammogram    Received second dose of Pfizer COVID-19 vaccine May 20, 2021  I asked her to seriously consider getting a booster, and she promised me she will think about it     Immunization History   Administered Date(s) Administered    COVID-19 MONOVALENT 12+ (Pfizer) 04/29/2021, 05/20/2021    Influenza Vaccine >6 months (Alfuria,Fluzone) 02/13/2020    Influenza Vaccine, 6+MO IM (QUADRIVALENT W/PRESERVATIVES) 10/02/2014    TDAP (Adacel,Boostrix) 05/15/2012    Td,adult,historic,unspecified 05/15/2012

## 2023-10-21 ENCOUNTER — HEALTH MAINTENANCE LETTER (OUTPATIENT)
Age: 48
End: 2023-10-21

## 2023-11-09 ENCOUNTER — OFFICE VISIT (OUTPATIENT)
Dept: INTERNAL MEDICINE | Facility: CLINIC | Age: 48
End: 2023-11-09
Payer: OTHER GOVERNMENT

## 2023-11-09 VITALS
BODY MASS INDEX: 38.51 KG/M2 | RESPIRATION RATE: 16 BRPM | TEMPERATURE: 98.5 F | HEART RATE: 97 BPM | HEIGHT: 61 IN | WEIGHT: 204 LBS | OXYGEN SATURATION: 95 % | SYSTOLIC BLOOD PRESSURE: 126 MMHG | DIASTOLIC BLOOD PRESSURE: 80 MMHG

## 2023-11-09 DIAGNOSIS — E11.65 POORLY CONTROLLED TYPE 2 DIABETES MELLITUS (H): Primary | ICD-10-CM

## 2023-11-09 DIAGNOSIS — Z72.0 TOBACCO ABUSE: ICD-10-CM

## 2023-11-09 DIAGNOSIS — D58.2 ELEVATED HEMOGLOBIN (H): ICD-10-CM

## 2023-11-09 DIAGNOSIS — Z23 NEEDS FLU SHOT: ICD-10-CM

## 2023-11-09 DIAGNOSIS — G56.01 CARPAL TUNNEL SYNDROME OF RIGHT WRIST: ICD-10-CM

## 2023-11-09 DIAGNOSIS — E78.2 MIXED HYPERLIPIDEMIA: ICD-10-CM

## 2023-11-09 DIAGNOSIS — Z23 PNEUMOCOCCAL VACCINATION ADMINISTERED AT CURRENT VISIT: ICD-10-CM

## 2023-11-09 LAB
ALBUMIN SERPL BCG-MCNC: 4.4 G/DL (ref 3.5–5.2)
ALP SERPL-CCNC: 130 U/L (ref 35–104)
ALT SERPL W P-5'-P-CCNC: 24 U/L (ref 0–50)
ANION GAP SERPL CALCULATED.3IONS-SCNC: 12 MMOL/L (ref 7–15)
AST SERPL W P-5'-P-CCNC: 18 U/L (ref 0–45)
BILIRUB SERPL-MCNC: 0.2 MG/DL
BUN SERPL-MCNC: 21 MG/DL (ref 6–20)
CALCIUM SERPL-MCNC: 10 MG/DL (ref 8.6–10)
CHLORIDE SERPL-SCNC: 104 MMOL/L (ref 98–107)
CHOLEST SERPL-MCNC: 158 MG/DL
CREAT SERPL-MCNC: 0.58 MG/DL (ref 0.51–0.95)
DEPRECATED HCO3 PLAS-SCNC: 19 MMOL/L (ref 22–29)
EGFRCR SERPLBLD CKD-EPI 2021: >90 ML/MIN/1.73M2
ERYTHROCYTE [DISTWIDTH] IN BLOOD BY AUTOMATED COUNT: 12.8 % (ref 10–15)
GLUCOSE SERPL-MCNC: 274 MG/DL (ref 70–99)
HBA1C MFR BLD: 10.7 % (ref 0–5.6)
HCT VFR BLD AUTO: 44.4 % (ref 35–47)
HDLC SERPL-MCNC: 37 MG/DL
HGB BLD-MCNC: 15.4 G/DL (ref 11.7–15.7)
LDLC SERPL CALC-MCNC: 78 MG/DL
MCH RBC QN AUTO: 31.6 PG (ref 26.5–33)
MCHC RBC AUTO-ENTMCNC: 34.7 G/DL (ref 31.5–36.5)
MCV RBC AUTO: 91 FL (ref 78–100)
NONHDLC SERPL-MCNC: 121 MG/DL
PLATELET # BLD AUTO: 294 10E3/UL (ref 150–450)
POTASSIUM SERPL-SCNC: 4.3 MMOL/L (ref 3.4–5.3)
PROT SERPL-MCNC: 7.4 G/DL (ref 6.4–8.3)
RBC # BLD AUTO: 4.87 10E6/UL (ref 3.8–5.2)
SODIUM SERPL-SCNC: 135 MMOL/L (ref 135–145)
T4 FREE SERPL-MCNC: 1.18 NG/DL (ref 0.9–1.7)
TRIGL SERPL-MCNC: 217 MG/DL
TSH SERPL DL<=0.005 MIU/L-ACNC: 4.45 UIU/ML (ref 0.3–4.2)
WBC # BLD AUTO: 15 10E3/UL (ref 4–11)

## 2023-11-09 PROCEDURE — 90677 PCV20 VACCINE IM: CPT | Performed by: INTERNAL MEDICINE

## 2023-11-09 PROCEDURE — 36415 COLL VENOUS BLD VENIPUNCTURE: CPT | Performed by: INTERNAL MEDICINE

## 2023-11-09 PROCEDURE — 80053 COMPREHEN METABOLIC PANEL: CPT | Performed by: INTERNAL MEDICINE

## 2023-11-09 PROCEDURE — 83036 HEMOGLOBIN GLYCOSYLATED A1C: CPT | Performed by: INTERNAL MEDICINE

## 2023-11-09 PROCEDURE — 84439 ASSAY OF FREE THYROXINE: CPT | Performed by: INTERNAL MEDICINE

## 2023-11-09 PROCEDURE — 80061 LIPID PANEL: CPT | Performed by: INTERNAL MEDICINE

## 2023-11-09 PROCEDURE — 90471 IMMUNIZATION ADMIN: CPT | Performed by: INTERNAL MEDICINE

## 2023-11-09 PROCEDURE — 90472 IMMUNIZATION ADMIN EACH ADD: CPT | Performed by: INTERNAL MEDICINE

## 2023-11-09 PROCEDURE — 84443 ASSAY THYROID STIM HORMONE: CPT | Performed by: INTERNAL MEDICINE

## 2023-11-09 PROCEDURE — 85027 COMPLETE CBC AUTOMATED: CPT | Performed by: INTERNAL MEDICINE

## 2023-11-09 PROCEDURE — 90682 RIV4 VACC RECOMBINANT DNA IM: CPT | Performed by: INTERNAL MEDICINE

## 2023-11-09 PROCEDURE — 99214 OFFICE O/P EST MOD 30 MIN: CPT | Mod: 25 | Performed by: INTERNAL MEDICINE

## 2023-11-09 NOTE — PROGRESS NOTES
Office Visit - Follow Up   Elissa Grajeda   48 year old female    Date of Visit: 11/9/2023    Chief Complaint   Patient presents with    Follow Up     DM, fasting.    Hand Problem     Rt wrist pain and numbness in fingers x 1 month        -------------------------------------------------------------------------------------------------------------------------  Assessment and Plan    Follow-up for multiple issues, mainly type 2 diabetes, but Elissa had 1 new issue to discuss today which is    Carpal tunnel syndrome right hand and wrist, with very characteristic symptoms of finger numbness  On physical exam she has classic tenderness when I push over her carpal tunnel on the right side.    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  She told me that she does need to manipulate the computer mouse and do a fair amount of keyboarding.    I explained to Elissa the mechanism of carpal tunnel syndrome, and the role of hand and wrist angle/position, which can create additional tension within the carpal tunnel.    I also explained to Elissa that her diabetes certainly does not help carpal tunnel syndrome, because blood sugar fluctuations can contribute to swelling of the tendons and even then median nerve within the carpal tunnel.    We will discuss her diabetes some more below, but I suggested that Elissa review her hand and wrist ergonomics when she is at the keyboard.  Consider using a mouse pad with a wrist support.  If she can use a carpal tunnel hand splint, that would be a good idea, but that might make her keyboarding more difficult.    I wrote a letter for Elissa's employer, recommending that she be allowed to have computerized voice recognition software installed on her or computer, to reduce her need to keyboard.      She is fasting this morning, so we will get a lipid panel, comprehensive metabolic panel, A1c, thyroid  cascade, and blood cell counts     Type 2 diabetes, possible mild neuropathy decreased nylon monofilament sensation right great toe; microalbuminuria demonstrated February 13, 2020  A1c was pretty bad at 12.2 and measured July 2023.    Elissa tell me this morning of November 9 that she hopes it will be better this morning.  She says her blood sugars checked at home are better, less than 200, for the time being lets continue on the metformin plus Jardiance.  She had intolerable side effects from Trulicity.    7-  Hemoglobin A1C  0.0 - 5.6 % 12.2 High       Bothersome side effect from Trulicity, switched to Jardiance 10 mg a day April 15, 2022  Continues Metformin 100 mg twice a day     I consider her A1c goal to be 7 or less.  She confirmed that she is taking metformin at the full dose of 1000 mg twice a day.     For microalbuminuria, she is on lisinopril 10 mg a day      Eye exam she said was November 2022--I reminded Elissa to get her annual diabetic eye exam.  She wants to set that up herself.    Wt Readings from Last 5 Encounters:   11/09/23 92.5 kg (204 lb)   07/13/23 94.8 kg (209 lb)   04/15/22 95.3 kg (210 lb)   11/03/21 94.6 kg (208 lb 9.6 oz)   07/07/21 96.1 kg (211 lb 12.8 oz)      Hyperlipidemia, on atorvastatin,  goal of LDL cholesterol less than 70.     4- (was not taking her meds)  Triglycerides <=149 mg/dL 247 High       Direct Measure HDL >=50 mg/dL 40 Low       Calculated <=129 mg/dL 200       Elevated liver enzymes   4-  Alkaline Phosphatase 45 - 120 U/L 171 High       ALT 0 - 45 U/L 51 High       AST 0 - 40 U/L 13       Cigarette smoking, which definitely needs to stop in light of her diabetes.      She told me November 9, 2023 that she is smoking less.  Her  Ad forces her to go outside to smoke.       She has nicotine gum on hand, which I prescribed for her back in August 2020.  Given her mental health conditions, I would not use Chantix.  If we do not use nicotine  or Chantix, I think she is going to have to do this the old-fashioned way.     Elevated hemoglobin  I reminded Elissa that the cigarette smoking could contribute to the elevated hemoglobin, because of the carbon monoxide content of cigarette smoke  7-  Hemoglobin  11.7 - 15.7 g/dL 15.8 High      Obesity with complications of diabetes and hyperlipidemia, needs to eventually lose about 50 pounds or more.  She estimates that her weight is actually dropped by about 30 pounds over the last year.      Wt Readings from Last 5 Encounters:   11/09/23 92.5 kg (204 lb)   07/13/23 94.8 kg (209 lb)   04/15/22 95.3 kg (210 lb)   11/03/21 94.6 kg (208 lb 9.6 oz)   07/07/21 96.1 kg (211 lb 12.8 oz)     Anxiety, depression, insomnia, which are under reasonably good control now on a rather complex medication regimen, currently working with [facility] nurse practitioner Alissa Ramirez  Her medication regimen is a rather complex when the consistent for drugs that are managed at [facility]. These are aripiprazole, duloxetine, gabapentin, and mirtazapine. added Cogentin 0.5 mg at bedtime to her regimen, which she finds quite helpful.  She told me that the combination is working well.  She does not think that any of these medications contributed to weight gain.    Chronic low back pain, for which she was been working with [facility] and was told about lumbar spinal stenosis.  We have not received any documents from [facility], and it would be useful to see the results of their imaging studies, which she recalls includes a lumbar spine MRI.  She was told that she has noncritical lumbar spinal stenosis.  She did start physical therapy at [facility], and I told her that she needs to do the home exercises, which she is currently not doing.     I told her that the leg pain could be from lumbar spinal stenosis, or radicular symptoms (nerve root pain from bad lumbar disks).  But it could also represent  diabetic neuropathy.  I asked her to ask [facility] to send us their documents, even better yet she should get her own copy, and bring them here.     Chronic left knee pain, degenerative arthritis  Also right knee  Left knee x-ray performed February 20, 2020 confirmed moderate primary osteoarthritis in all 3 compartments also a tiny effusion, but no fracture     I suggested she use over-the-counter Voltaren gel (topical diclofenac, 2 or 3 times a day.  Should penetrate into the knee joint pretty well.  Minimal systemic absorption.     Colon screening-- Cologard     Needs routine GYN care--she would like to choose her own gynecologist  I will order a mammogram     Vaccines  Elissa will take a seasonal flu shot and the Prevnar 20 pneumococcal vaccine today    Immunization History   Administered Date(s) Administered    COVID-19 MONOVALENT 12+ (Pfizer) 04/29/2021, 05/20/2021    Influenza Vaccine >6 months (Alfuria,Fluzone) 02/13/2020    Influenza Vaccine, 6+MO IM (QUADRIVALENT W/PRESERVATIVES) 10/02/2014    TDAP (Adacel,Boostrix) 05/15/2012    Td,adult,historic,unspecified 05/15/2012         --------------------------------------------------------------------------------------------------------------------------  History of Present Illness  This 48 year old old         Diabetes:   She presents for follow up of diabetes.  She is checking home blood glucose one time daily.   She checks blood glucose before meals.  Blood glucose is never over 200 and never under 70. She is aware of hypoglycemia symptoms including none.    She has no concerns regarding her diabetes at this time.   She is not experiencing numbness or burning in feet, excessive thirst, blurry vision, weight changes or redness, sores or blisters on feet. The patient has had a diabetic eye exam in the last 12 months. Eye exam performed on November 2022. Location of last eye exam [facility].           Hyperlipidemia:  She presents for follow up of  hyperlipidemia.   She is taking medication to lower cholesterol. She is not having myalgia or other side effects to statin medications.     Reason for visit:  Follow up and to discuss pain in wrist area and numbness from thumb to ring finer on right hand     She eats 0-1 servings of fruits and vegetables daily.She exercises with enough effort to increase her heart rate 20 to 29 minutes per day.  She exercises with enough effort to increase her heart rate 3 or less days per week. She is missing 2 dose(s) of medications per week.  She is not taking prescribed medications regularly due to remembering to take.       Wt Readings from Last 3 Encounters:   11/09/23 92.5 kg (204 lb)   07/13/23 94.8 kg (209 lb)   04/15/22 95.3 kg (210 lb)     BP Readings from Last 3 Encounters:   11/09/23 126/80   07/13/23 122/68   04/15/22 118/70       Lab Results   Component Value Date    WBC 8.9 07/13/2023    HGB 15.8 (H) 07/13/2023    HCT 47.9 (H) 07/13/2023     07/13/2023    CHOL 265 (H) 07/13/2023    TRIG 287 (H) 07/13/2023    HDL 38 (L) 07/13/2023    ALT 23 07/13/2023    AST 21 07/13/2023     07/13/2023    BUN 24.2 (H) 07/13/2023    CO2 25 07/13/2023    TSH 3.01 07/13/2023    MICROALBUR 2.67 (H) 02/13/2020        Review of Systems: A comprehensive review of systems was negative except as noted.  ---------------------------------------------------------------------------------------------------------------------------    Medications, Allergies, Social, and Problem List       Current Outpatient Medications   Medication Sig Dispense Refill    ACCU-CHEK GUIDE TEST STRIPS strips [ACCU-CHEK GUIDE TEST STRIPS STRIPS] USE TO TEST TWICE DAILY 200 strip 3    ARIPiprazole (ABILIFY) 10 MG tablet [ARIPIPRAZOLE (ABILIFY) 10 MG TABLET] Take 10 mg by mouth daily.       atorvastatin (LIPITOR) 20 MG tablet [ATORVASTATIN (LIPITOR) 20 MG TABLET] TAKE 1 TABLET BY MOUTH EVERY DAY 90 tablet 3    benztropine (COGENTIN) 0.5 MG tablet [BENZTROPINE  (COGENTIN) 0.5 MG TABLET] TAKE 1 TABLET BY MOUTH EVERYDAY AT BEDTIME      blood glucose meter (GLUCOMETER) [BLOOD GLUCOSE METER (GLUCOMETER)] Test TWICE daily. Dispense glucometer brand per patient's insurance at pharmacy discretion. 1 each 0    DULoxetine (CYMBALTA) 60 MG capsule [DULOXETINE (CYMBALTA) 60 MG CAPSULE] Take 120 mg by mouth daily.       empagliflozin (JARDIANCE) 10 MG TABS tablet Take 1 tablet (10 mg) by mouth daily 90 tablet 3    gabapentin (NEURONTIN) 300 MG capsule [GABAPENTIN (NEURONTIN) 300 MG CAPSULE] 600 mg 2 (two) times a day.  2    generic lancets (FINGERSTIX LANCETS) [GENERIC LANCETS (FINGERSTIX LANCETS)] Test TWICE daily. Dispense brand per patient's insurance at pharmacy discretion. 100 each 11    lisinopril (ZESTRIL) 10 MG tablet [LISINOPRIL (PRINIVIL,ZESTRIL) 10 MG TABLET] TAKE 1 TABLET BY MOUTH EVERY DAY 90 tablet 3    metFORMIN (GLUCOPHAGE) 1000 MG tablet [METFORMIN (GLUCOPHAGE) 1000 MG TABLET] TAKE 1 TABLET BY MOUTH TWICE A DAY WITH MEALS 180 tablet 3    mirtazapine (REMERON) 15 MG tablet [MIRTAZAPINE (REMERON) 15 MG TABLET] TAKE 1 TABLET BY MOUTH EVERYDAY AT BEDTIME       No Known Allergies  Social History     Tobacco Use    Smoking status: Every Day     Packs/day: 1.00     Years: 20.00     Additional pack years: 0.00     Total pack years: 20.00     Types: Cigarettes     Passive exposure: Current    Smokeless tobacco: Never   Vaping Use    Vaping Use: Never used     Patient Active Problem List   Diagnosis    Poorly controlled type 2 diabetes mellitus (H)    Anxiety    Insomnia    Hyperlipidemia    Tobacco abuse    Obesity    Microalbuminuria due to type 2 diabetes mellitus (H)    Benign essential hypertension    Spinal stenosis, lumbar region, with neurogenic claudication    Osteoarthritis of left knee, unspecified osteoarthritis type-- xray Feb 2020    Obesity (BMI 35.0-39.9) with comorbidity (H)        Reviewed, reconciled and updated       Physical Exam   General Appearance:    "    /80   Pulse 97   Temp 98.5  F (36.9  C)   Resp 16   Ht 1.549 m (5' 1\")   Wt 92.5 kg (204 lb)   SpO2 95%   BMI 38.55 kg/m      On physical exam she has classic tenderness when I push over her carpal tunnel on the right      Additional Information   I spent 20 minutes on this encounter, including reviewing interval history since last visit, examining the patient, explaining and counseling the issues enumerated in the Assessment and Plan (patient given a copy), ordering indicated tests, ordering prescriptions     DENISA NAVARRO MD, MD          "

## 2023-11-09 NOTE — PATIENT INSTRUCTIONS
Follow-up for multiple issues, mainly type 2 diabetes, but Elissa had 1 new issue to discuss today which is    Carpal tunnel syndrome right hand and wrist, with very characteristic symptoms of finger numbness  On physical exam she has classic tenderness when I push over her carpal tunnel on the right side.    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  She told me that she does need to manipulate the computer mouse and do a fair amount of keyboarding.    I explained to Elissa the mechanism of carpal tunnel syndrome, and the role of hand and wrist angle/position, which can create additional tension within the carpal tunnel.    I also explained to Elissa that her diabetes certainly does not help carpal tunnel syndrome, because blood sugar fluctuations can contribute to swelling of the tendons and even then median nerve within the carpal tunnel.    We will discuss her diabetes some more below, but I suggested that Elissa review her hand and wrist ergonomics when she is at the keyboard.  Consider using a mouse pad with a wrist support.  If she can use a carpal tunnel hand splint, that would be a good idea, but that might make her keyboarding more difficult.    I wrote a letter for Elissa's employer, recommending that she be allowed to have computerized voice recognition software installed on her or computer, to reduce her need to keyboard.      She is fasting this morning, so we will get a lipid panel, comprehensive metabolic panel, A1c, thyroid cascade, and blood cell counts     Type 2 diabetes, possible mild neuropathy decreased nylon monofilament sensation right great toe; microalbuminuria demonstrated February 13, 2020  A1c was pretty bad at 12.2 and measured July 2023.    Elissa tell me this morning of November 9 that she hopes it will be better this morning.  She says her blood sugars checked at home are better, less than 200,  for the time being lets continue on the metformin plus Jardiance.  She had intolerable side effects from Trulicity.    7-  Hemoglobin A1C  0.0 - 5.6 % 12.2 High       Bothersome side effect from Trulicity, switched to Jardiance 10 mg a day April 15, 2022  Continues Metformin 100 mg twice a day     I consider her A1c goal to be 7 or less.  She confirmed that she is taking metformin at the full dose of 1000 mg twice a day.     For microalbuminuria, she is on lisinopril 10 mg a day      Eye exam she said was November 2022--I reminded Elissa to get her annual diabetic eye exam.  She wants to set that up herself.    Wt Readings from Last 5 Encounters:   11/09/23 92.5 kg (204 lb)   07/13/23 94.8 kg (209 lb)   04/15/22 95.3 kg (210 lb)   11/03/21 94.6 kg (208 lb 9.6 oz)   07/07/21 96.1 kg (211 lb 12.8 oz)      Hyperlipidemia, on atorvastatin,  goal of LDL cholesterol less than 70.     4- (was not taking her meds)  Triglycerides <=149 mg/dL 247 High       Direct Measure HDL >=50 mg/dL 40 Low       Calculated <=129 mg/dL 200       Elevated liver enzymes   4-  Alkaline Phosphatase 45 - 120 U/L 171 High       ALT 0 - 45 U/L 51 High       AST 0 - 40 U/L 13       Cigarette smoking, which definitely needs to stop in light of her diabetes.      She told me November 9, 2023 that she is smoking less.  Her  Ad forces her to go outside to smoke.       She has nicotine gum on hand, which I prescribed for her back in August 2020.  Given her mental health conditions, I would not use Chantix.  If we do not use nicotine or Chantix, I think she is going to have to do this the old-fashioned way.     Elevated hemoglobin  I reminded Elissa that the cigarette smoking could contribute to the elevated hemoglobin, because of the carbon monoxide content of cigarette smoke  7-  Hemoglobin  11.7 - 15.7 g/dL 15.8 High      Obesity with complications of diabetes and hyperlipidemia, needs to eventually lose about  50 pounds or more.  She estimates that her weight is actually dropped by about 30 pounds over the last year.      Wt Readings from Last 5 Encounters:   11/09/23 92.5 kg (204 lb)   07/13/23 94.8 kg (209 lb)   04/15/22 95.3 kg (210 lb)   11/03/21 94.6 kg (208 lb 9.6 oz)   07/07/21 96.1 kg (211 lb 12.8 oz)     Anxiety, depression, insomnia, which are under reasonably good control now on a rather complex medication regimen, currently working with [facility] nurse practitioner Alissa Ramirez  Her medication regimen is a rather complex when the consistent for drugs that are managed at [facility]. These are aripiprazole, duloxetine, gabapentin, and mirtazapine. added Cogentin 0.5 mg at bedtime to her regimen, which she finds quite helpful.  She told me that the combination is working well.  She does not think that any of these medications contributed to weight gain.    Chronic low back pain, for which she was been working with [facility] and was told about lumbar spinal stenosis.  We have not received any documents from [facility], and it would be useful to see the results of their imaging studies, which she recalls includes a lumbar spine MRI.  She was told that she has noncritical lumbar spinal stenosis.  She did start physical therapy at [facility], and I told her that she needs to do the home exercises, which she is currently not doing.     I told her that the leg pain could be from lumbar spinal stenosis, or radicular symptoms (nerve root pain from bad lumbar disks).  But it could also represent diabetic neuropathy.  I asked her to ask [facility] to send us their documents, even better yet she should get her own copy, and bring them here.     Chronic left knee pain, degenerative arthritis  Also right knee  Left knee x-ray performed February 20, 2020 confirmed moderate primary osteoarthritis in all 3 compartments also a tiny effusion, but no fracture     I suggested she use  over-the-counter Voltaren gel (topical diclofenac, 2 or 3 times a day.  Should penetrate into the knee joint pretty well.  Minimal systemic absorption.     Colon screening-- Cologard     Needs routine GYN care--she would like to choose her own gynecologist  I will order a mammogram     Vaccines  Elissa will take a seasonal flu shot and the Prevnar 20 pneumococcal vaccine today    Immunization History   Administered Date(s) Administered    COVID-19 MONOVALENT 12+ (Pfizer) 04/29/2021, 05/20/2021    Influenza Vaccine >6 months (Alfuria,Fluzone) 02/13/2020    Influenza Vaccine, 6+MO IM (QUADRIVALENT W/PRESERVATIVES) 10/02/2014    TDAP (Adacel,Boostrix) 05/15/2012    Td,adult,historic,unspecified 05/15/2012

## 2023-11-30 ENCOUNTER — MYC MEDICAL ADVICE (OUTPATIENT)
Dept: INTERNAL MEDICINE | Facility: CLINIC | Age: 48
End: 2023-11-30
Payer: OTHER GOVERNMENT

## 2023-11-30 DIAGNOSIS — G56.01 CARPAL TUNNEL SYNDROME OF RIGHT WRIST: Primary | ICD-10-CM

## 2023-11-30 NOTE — TELEPHONE ENCOUNTER
Tell her I placed consultation request for orthopedic hand, to evaluate right sided carpal tunnel syndrome.    Tell her in the meantime keep using the hand/wrist splint

## 2023-12-09 NOTE — PROGRESS NOTES
ASSESSMENT & PLAN    Elissa was seen today for pain.    Diagnoses and all orders for this visit:    Carpal tunnel syndrome of right wrist  -     Orthopedic  Referral  -     Hand / Upper Extremity Injection: R carpal tunnel    Other orders  -     Cancel: Hand / Upper Extremity Injection/Arthrocentesis  -     Cancel: Medium Joint Injection/Arthrocentesis      48-year-old female presents with right wrist and bilateral hand numbness and tingling for the past 3 months.  She does have a history of carpal tunnel syndrome that resolved in the past, however has been having worse symptoms recently.  On physical exam, she has positive provocative testing for carpal tunnel including carpal compression test and Phalen's and slightly decreased sensation in the median nerve distribution on the right.  Of note she did have a prior cubital tunnel surgery on the right ulnar nerve at [facility].  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    Plan:  -US guided carpal tunnel injection performed in clinic today   -Wear neutral wrist splint at night   -Start carpal tunnel stretches, provided in clinic today  -If no improvement in 2 months, consider EMG vs. hand therapy vs. surgical referral for carpal tunnel release      Return in about 2 months (around 2/11/2024).      Dr. Marielena Galaviz DO  [facility]  Sports Medicine     Hand / Upper Extremity Injection: R carpal tunnel    Date/Time: 12/11/2023 4:15 PM    Performed by: Marielena Galaviz DO  Authorized by: Marielena Galaviz DO    Indications:  Pain  Needle Size:  25 G  Guidance: ultrasound    Approach:  Ulnar  Condition: carpal tunnel      Site:  R carpal tunnel  Medications:  40 mg triamcinolone 40 MG/ML; 3 mL lidocaine 1 %  Outcome:  Tolerated well, no immediate complications  Procedure discussed: discussed risks, benefits, and alternatives    Consent Given by:   Patient  Timeout: timeout called immediately prior to procedure    Prep: patient was prepped and draped in usual sterile fashion     Ultrasound was used to ensure safe and accurate needle placement and injection. Ultrasound images of the procedure were permanently stored.        -----  Chief Complaint   Patient presents with    Right Wrist - Pain       SUBJECTIVE  Elissa rGajeda is a/an 48 year old right-handed female who is seen in consultation at the request of  Umer Vega M.D. for evaluation of R wrist pain that has been ongoing for many years, but the last few months, the pain has gotten worse. She does wear a wrist brace at night. Pain is located over the medial wrist and numbness/tingling into the first three fingers. Symptoms are worsened by her job where she's at the computer all day. Associated symptoms include: Swelling, Numbness/tingling, and shooting pain up the arm .    The patient is seen with her Ad    Prior injury/Surgical history of affected joint: R ulnar nerve surgery 2 years ago-done at [facility]  Social History/Occupation: Customer service; [employer]    REVIEW OF SYSTEMS:  Pertinent positives/negative: As stated above in HPI    OBJECTIVE:  There were no vitals taken for this visit.   General: Alert and in no distress  Skin: no visable rashes  CV: Extremities appear well perfused   Resp: normal respiratory effort, no conversational dyspnea   Psych: normal mood, affect  MSK:  RIGHT WRIST  Inspection:    No swelling, bruising, discoloration, or obvious deformity or asymmetry  Palpation:  No significant tenderness   metacarpals: normal    Thumb: no thenar eminence wasting    Fingers: no triggering  Range of Motion:    Full (active and passive) flexion, extension, pronation/supination, and ulnar/radial deviation.  Strength:    No deficits in flexion, extension, ulnar/radial deviation, or  strength.  Slightly diminished APB and FDI strength on the right  Special Tests:     Positive: Phalen's, carpal compression test       RADIOLOGY:  Final results and radiologist's interpretation available in the [facility] health record.  No new imaging    Review of prior external note(s) from - primary care

## 2023-12-11 ENCOUNTER — OFFICE VISIT (OUTPATIENT)
Dept: ORTHOPEDICS | Facility: CLINIC | Age: 48
End: 2023-12-11
Payer: OTHER GOVERNMENT

## 2023-12-11 DIAGNOSIS — G56.01 CARPAL TUNNEL SYNDROME OF RIGHT WRIST: Primary | ICD-10-CM

## 2023-12-11 PROCEDURE — 99203 OFFICE O/P NEW LOW 30 MIN: CPT | Mod: 25 | Performed by: STUDENT IN AN ORGANIZED HEALTH CARE EDUCATION/TRAINING PROGRAM

## 2023-12-11 PROCEDURE — 20526 THER INJECTION CARP TUNNEL: CPT | Mod: RT | Performed by: STUDENT IN AN ORGANIZED HEALTH CARE EDUCATION/TRAINING PROGRAM

## 2023-12-11 PROCEDURE — 76942 ECHO GUIDE FOR BIOPSY: CPT | Mod: 26 | Performed by: STUDENT IN AN ORGANIZED HEALTH CARE EDUCATION/TRAINING PROGRAM

## 2023-12-11 RX ORDER — TRIAMCINOLONE ACETONIDE 40 MG/ML
40 INJECTION, SUSPENSION INTRA-ARTICULAR; INTRAMUSCULAR
Status: SHIPPED | OUTPATIENT
Start: 2023-12-11

## 2023-12-11 RX ORDER — LIDOCAINE HYDROCHLORIDE 10 MG/ML
3 INJECTION, SOLUTION INFILTRATION; PERINEURAL
Status: SHIPPED | OUTPATIENT
Start: 2023-12-11

## 2023-12-11 RX ADMIN — LIDOCAINE HYDROCHLORIDE 3 ML: 10 INJECTION, SOLUTION INFILTRATION; PERINEURAL at 16:15

## 2023-12-11 RX ADMIN — TRIAMCINOLONE ACETONIDE 40 MG: 40 INJECTION, SUSPENSION INTRA-ARTICULAR; INTRAMUSCULAR at 16:15

## 2023-12-11 NOTE — LETTER
12/11/2023         RE: Elissa Grajeda  [address]  [place] [address]        Dear Colleague,    Thank you for referring your patient, Elissa Grajeda, to the [facility]TS MEDICINE [facility]IN[facility]. Please see a copy of my visit note below.    ASSESSMENT & PLAN    Elissa was seen today for pain.    Diagnoses and all orders for this visit:    Carpal tunnel syndrome of right wrist  -     Orthopedic  Referral  -     Hand / Upper Extremity Injection: R carpal tunnel    Other orders  -     Cancel: Hand / Upper Extremity Injection/Arthrocentesis  -     Cancel: Medium Joint Injection/Arthrocentesis      48-year-old female presents with right wrist and bilateral hand numbness and tingling for the past 3 months.  She does have a history of carpal tunnel syndrome that resolved in the past, however has been having worse symptoms recently.  On physical exam, she has positive provocative testing for carpal tunnel including carpal compression test and Phalen's and slightly decreased sensation in the median nerve distribution on the right.  Of note she did have a prior cubital tunnel surgery on the right ulnar nerve at [facility].  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    Plan:  -US guided carpal tunnel injection performed in clinic today   -Wear neutral wrist splint at night   -Start carpal tunnel stretches, provided in clinic today  -If no improvement in 2 months, consider EMG vs. hand therapy vs. surgical referral for carpal tunnel release      Return in about 2 months (around 2/11/2024).      Dr. Marielena Galaviz DO  [facility]  Sports Medicine     Hand / Upper Extremity Injection: R carpal tunnel    Date/Time: 12/11/2023 4:15 PM    Performed by: Marielena Galaviz DO  Authorized by: Marielena Galaviz DO    Indications:  Pain  Needle Size:  25 G  Guidance: ultrasound     Approach:  Ulnar  Condition: carpal tunnel      Site:  R carpal tunnel  Medications:  40 mg triamcinolone 40 MG/ML; 3 mL lidocaine 1 %  Outcome:  Tolerated well, no immediate complications  Procedure discussed: discussed risks, benefits, and alternatives    Consent Given by:  Patient  Timeout: timeout called immediately prior to procedure    Prep: patient was prepped and draped in usual sterile fashion     Ultrasound was used to ensure safe and accurate needle placement and injection. Ultrasound images of the procedure were permanently stored.        -----  Chief Complaint   Patient presents with     Right Wrist - Pain       SUBJECTIVE  Elissa Grajeda is a/an 48 year old right-handed female who is seen in consultation at the request of  Umer Vega M.D. for evaluation of R wrist pain that has been ongoing for many years, but the last few months, the pain has gotten worse. She does wear a wrist brace at night. Pain is located over the medial wrist and numbness/tingling into the first three fingers. Symptoms are worsened by her job where she's at the computer all day. Associated symptoms include: Swelling, Numbness/tingling, and shooting pain up the arm .    The patient is seen with her , Ad    Prior injury/Surgical history of affected joint: R ulnar nerve surgery 2 years ago-done at [facility]  Social History/Occupation: Customer service; [employer]    REVIEW OF SYSTEMS:  Pertinent positives/negative: As stated above in HPI    OBJECTIVE:  There were no vitals taken for this visit.   General: Alert and in no distress  Skin: no visable rashes  CV: Extremities appear well perfused   Resp: normal respiratory effort, no conversational dyspnea   Psych: normal mood, affect  MSK:  RIGHT WRIST  Inspection:    No swelling, bruising, discoloration, or obvious deformity or asymmetry  Palpation:  No significant tenderness   metacarpals: normal    Thumb: no thenar eminence wasting    Fingers: no  triggering  Range of Motion:    Full (active and passive) flexion, extension, pronation/supination, and ulnar/radial deviation.  Strength:    No deficits in flexion, extension, ulnar/radial deviation, or  strength.  Slightly diminished APB and FDI strength on the right  Special Tests:    Positive: Phalen's, carpal compression test       RADIOLOGY:  Final results and radiologist's interpretation available in the [facility] health record.  No new imaging    Review of prior external note(s) from - primary care             Again, thank you for allowing me to participate in the care of your patient.        Sincerely,        Marielena Galaviz, DO

## 2023-12-11 NOTE — PATIENT INSTRUCTIONS
Thank you for choosing [facility]!    DR. CARVAJAL'S CLINIC LOCATIONS:     [place]  TRIAGE LINE: [phone] [email or web address] APPOINTMENTS: [phone]   [place] [address] RADIOLOGY: [phone]   (Mondays & Tuesdays) HAND THERAPY: [phone]    PHYSICAL THERAPY: [phone]   [place] BILLING QUESTIONS: [phone] [address] FAX: [phone]   [place] [address]    (Thursdays & Fridays)       Plan:  -US guided carpal tunnel injection performed in clinic today   -Wear neutral wrist splint at night   -Start carpal tunnel stretches  -If no improvement in 2 months, consider EMG vs. hand therapy vs. Surgical referral

## 2024-02-12 ENCOUNTER — OFFICE VISIT (OUTPATIENT)
Dept: ORTHOPEDICS | Facility: CLINIC | Age: 49
End: 2024-02-12
Payer: OTHER GOVERNMENT

## 2024-02-12 VITALS — BODY MASS INDEX: 38.51 KG/M2 | HEIGHT: 61 IN | WEIGHT: 204 LBS

## 2024-02-12 DIAGNOSIS — G56.01 CARPAL TUNNEL SYNDROME OF RIGHT WRIST: Primary | ICD-10-CM

## 2024-02-12 PROCEDURE — 99213 OFFICE O/P EST LOW 20 MIN: CPT | Performed by: STUDENT IN AN ORGANIZED HEALTH CARE EDUCATION/TRAINING PROGRAM

## 2024-02-12 NOTE — LETTER
2/12/2024         RE: Elissa Grajeda  [address]  [place] [address]        Dear Colleague,    Thank you for referring your patient, Elissa Grajeda, to the [facility] SP[facility]TS MEDICINE CLIN[facility]. Please see a copy of my visit note below.    ASSESSMENT & PLAN    Elissa was seen today for follow up.    Diagnoses and all orders for this visit:    Carpal tunnel syndrome of right wrist  -     EMG; Future      48-year-old female presents to follow-up on right carpal tunnel syndrome.  Injection around the median nerve at the carpal tunnel performed approximately 2 months ago provided almost no relief of her symptoms, and she continues to have numbness tingling in the median nerve distribution as well as reduced  strength of the right hand. Symptoms are provoked by carpal compression test.    Plan:  -EMG/NCS of right arm. If you can not get an appointment within the next few weeks, please send me a message/call me  -Can continue to wear wrist splints at night   -Continue to take gabapentin as prescribed  -Plan for referral to hand surgery for carpal tunnel release after EMG    Return if symptoms worsen or fail to improve.      Dr. Marielena Galaviz, DO  [facility]  Sports Medicine     -----  Chief Complaint   Patient presents with     Right Wrist - Follow Up       SUBJECTIVE  Elissa Grajeda is a/an 48 year old female who is seen to follow-up on R carpal tunnel syndrome. The patient was last seen 12/11/23 and received a carpal tunnel injection at that time. Since last visit, she has had increased numbness and tingling in the first three digits, worse than it was prior to the injection. The injection at last visit made the pain worse and did not provide any relief. No new injury or trauma to the area. Has been trying bracing at night but as soon as she takes it off, it hurts again. She is also noticing weakness in the hand and difficulty gripping, such  "as a pen.     The patient is seen with       REVIEW OF SYSTEMS:  Pertinent positives/negative: As stated above in HPI    OBJECTIVE:  Ht 1.549 m (5' 1\")   Wt 92.5 kg (204 lb)   BMI 38.55 kg/m     General: Alert and in no distress  Skin: no visable rashes  CV: Extremities appear well perfused   Resp: normal respiratory effort, no conversational dyspnea   Psych: normal mood, affect  MSK:  RIGHT WRIST  Inspection:    No swelling, bruising, discoloration, or obvious deformity or asymmetry  Palpation:  Bony and ligamentous line marks are nontender.    Thumb: normal  Range of Motion:    Full (active and passive) flexion, extension, pronation/supination, and ulnar/radial deviation.  Strength:     strength limited by weakness and pain  Special Tests:    Positive: Tinel's (carpal tunnel), Tinel's (cubital tunnel), carpal compression test  Sensation to light touch slightly reduced in the median nerve distribution       RADIOLOGY:  Final results and radiologist's interpretation available in the [facility] health record.  No new imaging today                 Again, thank you for allowing me to participate in the care of your patient.        Sincerely,        Marielena Galaviz, DO  "

## 2024-02-12 NOTE — PATIENT INSTRUCTIONS
Plan:  -EMG/NCS of right arm. If you can not get an appointment within the next few weeks, please send me a message/call me  -Can wear wrist splints at night   -Plan for referral to hand surgery after EMG    Thank you for choosing [facility]!    DR. GALAVIZ'S CLINIC LOCATIONS:     [place]  TRIAGE LINE: [phone] [address] APPOINTMENTS: [phone]   [place] [address] RADIOLOGY: [phone]   (Mondays & Tuesdays) HAND THERAPY: [phone]    PHYSICAL THERAPY: [phone]   [place] BILLING QUESTIONS: [phone] [address] FAX: [phone]   [place] [address]    (Thursdays & Fridays)      If you have any questions or concerns after your appointment, you can send Dr. Galaviz a [employer] message or call the clinic number at [phone] at any time.    Marielena Galaviz DO, CAM  [facility]  Sports Medicine

## 2024-02-12 NOTE — PROGRESS NOTES
"ASSESSMENT & PLAN    Elissa was seen today for follow up.    Diagnoses and all orders for this visit:    Carpal tunnel syndrome of right wrist  -     EMG; Future      48-year-old female presents to follow-up on right carpal tunnel syndrome.  Injection around the median nerve at the carpal tunnel performed approximately 2 months ago provided almost no relief of her symptoms, and she continues to have numbness tingling in the median nerve distribution as well as reduced  strength of the right hand. Symptoms are provoked by carpal compression test.    Plan:  -EMG/NCS of right arm. If you can not get an appointment within the next few weeks, please send me a message/call me  -Can continue to wear wrist splints at night   -Continue to take gabapentin as prescribed  -Plan for referral to hand surgery for carpal tunnel release after EMG    Return if symptoms worsen or fail to improve.      Dr. Marielena Galaviz, DO  [facility]  Sports Medicine     -----  Chief Complaint   Patient presents with    Right Wrist - Follow Up       SUBJECTIVE  Elissa Grajeda is a/an 48 year old female who is seen to follow-up on R carpal tunnel syndrome. The patient was last seen 12/11/23 and received a carpal tunnel injection at that time. Since last visit, she has had increased numbness and tingling in the first three digits, worse than it was prior to the injection. The injection at last visit made the pain worse and did not provide any relief. No new injury or trauma to the area. Has been trying bracing at night but as soon as she takes it off, it hurts again. She is also noticing weakness in the hand and difficulty gripping, such as a pen.     The patient is seen with       REVIEW OF SYSTEMS:  Pertinent positives/negative: As stated above in HPI    OBJECTIVE:  Ht 1.549 m (5' 1\")   Wt 92.5 kg (204 lb)   BMI 38.55 kg/m     General: Alert and in no distress  Skin: no visable rashes  CV: Extremities " appear well perfused   Resp: normal respiratory effort, no conversational dyspnea   Psych: normal mood, affect  MSK:  RIGHT WRIST  Inspection:    No swelling, bruising, discoloration, or obvious deformity or asymmetry  Palpation:  Bony and ligamentous line marks are nontender.    Thumb: normal  Range of Motion:    Full (active and passive) flexion, extension, pronation/supination, and ulnar/radial deviation.  Strength:     strength limited by weakness and pain  Special Tests:    Positive: Tinel's (carpal tunnel), Tinel's (cubital tunnel), carpal compression test  Sensation to light touch slightly reduced in the median nerve distribution       RADIOLOGY:  Final results and radiologist's interpretation available in the [facility] health record.  No new imaging today

## 2024-03-04 ENCOUNTER — OFFICE VISIT (OUTPATIENT)
Dept: NEUROLOGY | Facility: CLINIC | Age: 49
End: 2024-03-04
Attending: STUDENT IN AN ORGANIZED HEALTH CARE EDUCATION/TRAINING PROGRAM
Payer: OTHER GOVERNMENT

## 2024-03-04 DIAGNOSIS — G56.01 CARPAL TUNNEL SYNDROME OF RIGHT WRIST: Primary | ICD-10-CM

## 2024-03-04 PROCEDURE — 95909 NRV CNDJ TST 5-6 STUDIES: CPT | Performed by: PSYCHIATRY & NEUROLOGY

## 2024-03-04 PROCEDURE — 95886 MUSC TEST DONE W/N TEST COMP: CPT | Mod: RT | Performed by: PSYCHIATRY & NEUROLOGY

## 2024-03-04 NOTE — PROCEDURES
[facility]    Formerly [facility]  [address] [place] [address], [address]  [place] [address]  Tel:  [phone]   Fax: [phone]    Patient: Elissa VALDERRAMA Gender: Female   MRN: [id number] : 1975       Visit Date: 3/4/2024 1:10:20 PM Interpreted by: Alberto Horner MD   Age: 48 years Ref Provider: Marielean Galaviz DO     Reason for visit:  Evaluate right upper. c/o pain, numbness in right hand/fingers, mainly in digits I-III > 2 years. Diabetic > 5 years. h/o ulnar surgery.   NCS+  Motor Sites      Latency Amplitude Distance Velocity   Site (ms) Norm (mV) Norm (cm) (m/s) Norm   Right Median (APB)   Wrist 3.9  < 4.5 8.8  > 3.0 7     Elbow 8.3 - 8.6 - 24 55  > 50   Right Ulnar (ADM)   Wrist 3.3  < 3.6 8.5  > 5.0 7     Bel Elbow 6.8 - 8.5 - 21 60  > 50   Abv Elbow 9.1 - 8.2 - 13 57  > 50     NCS+  Sensory Sites      Onset Lat Peak Lat Amp (O-P) Distance Velocity   Site ms (ms)  V Norm cm m/s Norm   Right Median   Wrist-Dig II 2.3 3.2 22  > 15 13 57  > 50   Palm-Wrist 1.58 2.3 32 - 8 51 -   Right Ulnar   Wrist-Dig V 2.2 2.9 12  > 5 11 50  > 50   Palm-Wrist 1.48 2.2 13 - 8 54 -     Inter-Nerve Comparisons     Nerve 1 Value 1 Nerve 2 Value 2 Parameter Result Normal   Sensory Sites   R Median Palm-Wrist 2.3 ms R Ulnar Palm-Wrist 2.2 ms Peak Lat Diff 0.10 ms <0.40     F Wave Studies     NR F-Lat (ms) Lat Norm (ms) L-R F-Lat (ms) L-R Lat Norm   Right Ulnar (Abd Dig Min)      29.74 <32  <2.5     Electromyography     Side Muscle Fib PSW Fasc IA # Amp Dur PPP RcmtRate Note Comment   Right Brachiorad None None None N N N N N N N    Right Pronator Teres None None None N N N N N N N    Right Biceps None None None N N N N N N N    Right Deltoid None None None N N N N N N N    Right Triceps None None None N N N N N N N    Right EDC None None None N N N N N N N    Right FCU None None None N N N N N N N    Right ADM None None None N N N N N N N    Right FDI None None  None N N N N N N N    Right APB None None None N N N N N N N          Right median motor clinic study is normal  Right ulnar motor conduction study is normal  Right ulnar F wave latency is normal    Right median snap potential normal  Right ulnar snap potential normal  Right median palmar latency normal  Right ulnar palmar latency normal    Needle examination of the right upper extremity no active or chronic denervation, normal study    Impression  Normal EMG of the right upper extremity

## 2024-03-04 NOTE — LETTER
3/4/2024         RE: Elissa Grajeda  [address]  [place] [address]        Dear Colleague,    Thank you for referring your patient, Elissa Grajeda, to the [facility] NEUROLOGY CLINIC [place]. Please see a copy of my visit note below.    See EMG report      Again, thank you for allowing me to participate in the care of your patient.        Sincerely,        daniela Horner MD

## 2024-03-05 NOTE — PROGRESS NOTES
ASSESSMENT & PLAN    Elissa was seen today for pain.    Diagnoses and all orders for this visit:    Cervical radiculopathy  -     XR Cervical Spine 2/3 vws; Future  -     naproxen (NAPROSYN) 500 MG tablet; Take 1 tablet (500 mg) by mouth 2 times daily (with meals)  -     Physical Therapy  Referral; Future  -     Spine  Referral; Future    Right hand paresthesia      48-year-old female with a history of right ulnar nerve decompression presents to follow-up on right hand numbness and tingling for several months.  Does also have reported positive history of carpal tunnel syndrome, however treatment for carpal tunnel has not improved her symptoms and recent EMG was essentially unremarkable. Today, she also complains of right-sided neck pain and has a positive Spurling test on exam with associated decreased sensation in the C6-7 dermatomes on the right.  We discussed that I believe the etiology of her pain is likely coming from her cervical spine rather than a peripheral nerve at this point.  She also has uncontrolled diabetes, and may have some aspect of sensory polyneuropathy.    Plan:  -Start PT for neck   -Start Naproxen 500mg twice daily for 5 days then as needed. Take with food.  Discussed that we would avoid steroids at this time due to her hyperglycemia  -XR of cervical spine today  -Follow-up with medical spine for further evaluation of C-spine and consider other interventions such as injections in the future  -Try heat or ice as needed to tight muscles   -Continue to work on glycemic control with primary care    Return if symptoms worsen or fail to improve.      Dr. Marielena Galaviz, DO  [facility]  Sports Medicine     -----  Chief Complaint   Patient presents with    Right Wrist - Pain       SUBJECTIVE  Elissa Grajeda is a/an 48 year old female who is seen to follow-up on right wrist/hand pain and numbness. The patient was last seen 2/12/24. Since last visit,  "she obtained an EMG study and reports no improvement in the wrist pain. She endorses mild right sided neck pain and shoulder numbness that's been there \"for a while\" she has had no real improvement in symptoms.  Today she reports that all 5 digits are affected, whereas previously seem to affect only lateral 3 digits.    The patient is seen with her friend's son, Yakov.         REVIEW OF SYSTEMS:  Pertinent positives/negative: As stated above in HPI    OBJECTIVE:  /84    General: Alert and in no distress  Skin: no visable rashes  CV: Extremities appear well perfused   Resp: normal respiratory effort, no conversational dyspnea   Psych: normal mood, affect  MSK:  Sensation slightly diminished to C6-7 dermatomes to light touch on the right  + Spurling on the right   Upper extremity strength 5/5 bilaterally but reports feel slightly \"shaky\" with right triceps extension  Mild provocation of symptoms with carpal compression test    RADIOLOGY:  Final results and radiologist's interpretation available in the [facility] health record.  No new imaging  EMG reviewed, normal results                 Disclaimer: This note consists of symbols derived from dictation and/or voice recognition software. As a result, there may be errors in the script that have gone undetected. Please consider this when interpreting information found in this chart.    "

## 2024-03-11 ENCOUNTER — OFFICE VISIT (OUTPATIENT)
Dept: ORTHOPEDICS | Facility: CLINIC | Age: 49
End: 2024-03-11
Payer: OTHER GOVERNMENT

## 2024-03-11 ENCOUNTER — ANCILLARY PROCEDURE (OUTPATIENT)
Dept: GENERAL RADIOLOGY | Facility: CLINIC | Age: 49
End: 2024-03-11
Attending: STUDENT IN AN ORGANIZED HEALTH CARE EDUCATION/TRAINING PROGRAM
Payer: OTHER GOVERNMENT

## 2024-03-11 VITALS — DIASTOLIC BLOOD PRESSURE: 84 MMHG | SYSTOLIC BLOOD PRESSURE: 138 MMHG

## 2024-03-11 DIAGNOSIS — M54.12 CERVICAL RADICULOPATHY: ICD-10-CM

## 2024-03-11 DIAGNOSIS — M54.12 CERVICAL RADICULOPATHY: Primary | ICD-10-CM

## 2024-03-11 DIAGNOSIS — R20.2 RIGHT HAND PARESTHESIA: ICD-10-CM

## 2024-03-11 PROCEDURE — 99213 OFFICE O/P EST LOW 20 MIN: CPT | Performed by: STUDENT IN AN ORGANIZED HEALTH CARE EDUCATION/TRAINING PROGRAM

## 2024-03-11 PROCEDURE — 72040 X-RAY EXAM NECK SPINE 2-3 VW: CPT | Mod: TC | Performed by: RADIOLOGY

## 2024-03-11 RX ORDER — NAPROXEN 500 MG/1
500 TABLET ORAL 2 TIMES DAILY WITH MEALS
Qty: 30 TABLET | Refills: 1 | Status: SHIPPED | OUTPATIENT
Start: 2024-03-11

## 2024-03-11 NOTE — PATIENT INSTRUCTIONS
1. Cervical radiculopathy    2. Right hand paresthesia        Plan:  -Start PT for neck   -Start Naproxen 500mg twice daily for 5 days then as needed. Take with food  -XR of cervical spine today  -Follow-up with medical spine to discuss other interventions such as injection in the future  -Try heat or ice as needed to tight muscles     If you have any questions or concerns after your appointment, please send a [employer] message or call the clinic at [phone]    Marielena Galaviz DO, [facility]  [facility]  Sports Medicine    Thank you for choosing [facility]!    DR. GALAVIZ'S CLINIC LOCATIONS:     [place]  TRIAGE LINE: [phone]   [address] [place] [address] APPOINTMENTS: [phone]   [place] [address] RADIOLOGY: [phone]   (Mondays & Tuesdays) HAND THERAPY: [phone]    PHYSICAL THERAPY: [phone]   [place] BILLING QUESTIONS: [phone] [address] FAX: [phone]   [place] [address]    (Thursdays & Fridays)

## 2024-03-11 NOTE — LETTER
3/11/2024         RE: Elissa Grajeda  [address]  [place] [address]        Dear Colleague,    Thank you for referring your patient, Elissa Grajeda, to the [facility]OR[facility] MEDICINE CLIN[facility]. Please see a copy of my visit note below.    ASSESSMENT & PLAN    Elissa was seen today for pain.    Diagnoses and all orders for this visit:    Cervical radiculopathy  -     XR Cervical Spine 2/3 vws; Future  -     naproxen (NAPROSYN) 500 MG tablet; Take 1 tablet (500 mg) by mouth 2 times daily (with meals)  -     Physical Therapy  Referral; Future  -     Spine  Referral; Future    Right hand paresthesia      48-year-old female with a history of right ulnar nerve decompression presents to follow-up on right hand numbness and tingling for several months.  Does also have reported positive history of carpal tunnel syndrome, however treatment for carpal tunnel has not improved her symptoms and recent EMG was essentially unremarkable. Today, she also complains of right-sided neck pain and has a positive Spurling test on exam with associated decreased sensation in the C6-7 dermatomes on the right.  We discussed that I believe the etiology of her pain is likely coming from her cervical spine rather than a peripheral nerve at this point.  She also has uncontrolled diabetes, and may have some aspect of sensory polyneuropathy.    Plan:  -Start PT for neck   -Start Naproxen 500mg twice daily for 5 days then as needed. Take with food.  Discussed that we would avoid steroids at this time due to her hyperglycemia  -XR of cervical spine today  -Follow-up with medical spine for further evaluation of C-spine and consider other interventions such as injections in the future  -Try heat or ice as needed to tight muscles   -Continue to work on glycemic control with primary care    Return if symptoms worsen or fail to improve.      Dr. Marielena Galaviz, DO  [facility]  "Physicians  Sports Medicine     -----  Chief Complaint   Patient presents with     Right Wrist - Pain       SUBJECTIVE  Elissa Grajeda is a/an 48 year old female who is seen to follow-up on right wrist/hand pain and numbness. The patient was last seen 2/12/24. Since last visit, she obtained an EMG study and reports no improvement in the wrist pain. She endorses mild right sided neck pain and shoulder numbness that's been there \"for a while\" she has had no real improvement in symptoms.  Today she reports that all 5 digits are affected, whereas previously seem to affect only lateral 3 digits.    The patient is seen with her friend's son, Yakov.         REVIEW OF SYSTEMS:  Pertinent positives/negative: As stated above in HPI    OBJECTIVE:  /84    General: Alert and in no distress  Skin: no visable rashes  CV: Extremities appear well perfused   Resp: normal respiratory effort, no conversational dyspnea   Psych: normal mood, affect  MSK:  Sensation slightly diminished to C6-7 dermatomes to light touch on the right  + Spurling on the right   Upper extremity strength 5/5 bilaterally but reports feel slightly \"shaky\" with right triceps extension  Mild provocation of symptoms with carpal compression test    RADIOLOGY:  Final results and radiologist's interpretation available in the [facility] health record.  No new imaging  EMG reviewed, normal results                 Disclaimer: This note consists of symbols derived from dictation and/or voice recognition software. As a result, there may be errors in the script that have gone undetected. Please consider this when interpreting information found in this chart.        Again, thank you for allowing me to participate in the care of your patient.        Sincerely,        Marielena Galaviz, DO  "

## 2024-03-15 ENCOUNTER — OFFICE VISIT (OUTPATIENT)
Dept: PHYSICAL MEDICINE AND REHAB | Facility: CLINIC | Age: 49
End: 2024-03-15
Attending: STUDENT IN AN ORGANIZED HEALTH CARE EDUCATION/TRAINING PROGRAM
Payer: OTHER GOVERNMENT

## 2024-03-15 VITALS — HEART RATE: 85 BPM | SYSTOLIC BLOOD PRESSURE: 140 MMHG | DIASTOLIC BLOOD PRESSURE: 88 MMHG

## 2024-03-15 DIAGNOSIS — M54.12 CERVICAL RADICULOPATHY: ICD-10-CM

## 2024-03-15 PROCEDURE — 99204 OFFICE O/P NEW MOD 45 MIN: CPT | Performed by: NURSE PRACTITIONER

## 2024-03-15 RX ORDER — CYCLOBENZAPRINE HCL 5 MG
5-10 TABLET ORAL 3 TIMES DAILY PRN
Qty: 45 TABLET | Refills: 0 | Status: SHIPPED | OUTPATIENT
Start: 2024-03-15

## 2024-03-15 RX ORDER — GABAPENTIN 300 MG/1
600 CAPSULE ORAL 3 TIMES DAILY
Qty: 180 CAPSULE | Refills: 0 | Status: SHIPPED | OUTPATIENT
Start: 2024-03-15

## 2024-03-15 RX ORDER — MELOXICAM 7.5 MG/1
7.5 TABLET ORAL DAILY
Qty: 30 TABLET | Refills: 0 | Status: SHIPPED | OUTPATIENT
Start: 2024-03-15

## 2024-03-15 ASSESSMENT — PAIN SCALES - GENERAL: PAINLEVEL: SEVERE PAIN (6)

## 2024-03-15 NOTE — LETTER
3/15/2024         RE: Elissa Grajeda  [address]  [place] [address]        Dear Colleague,    Thank you for referring your patient, Elissa Grajeda, to the [facility]. Please see a copy of my visit note below.    ASSESSMENT: Elissa Grajeda is a 48 year old female presents for consultation at the request of PCP Umer Vega, who presents today for new patient evaluation of :     -cervical radiculopathy    Patient has 6mo hx R arm pain with more recent numbness/tingling /weakness of the arm. Full strength on exam, some sensory loss of the general hand. Reflexes wnl. Recommended starting PT as planned. Increase gabapentin , switch naproxen to meloxicam and start flexeril. We will see her back after completion of PT or sooner if worsening symptoms or no improvement after several weeks of PT. Could consider cervical MRI as next step for treatment planning. cervical XR independently reviewed and do not show any significant alignment or advanced degenerative concerns.        3/15/2024     9:17 AM   OSWESTRY DISABILITY INDEX   Count 10   Sum 13   Oswestry Score (%) 26 %            Diagnoses and all orders for this visit:  Cervical radiculopathy  -     Spine  Referral  -     meloxicam (MOBIC) 7.5 MG tablet; Take 1 tablet (7.5 mg) by mouth daily  -     cyclobenzaprine (FLEXERIL) 5 MG tablet; Take 1-2 tablets (5-10 mg) by mouth 3 times daily as needed for muscle spasms  -     gabapentin (NEURONTIN) 300 MG capsule; Take 2 capsules (600 mg) by mouth 3 times daily       PLAN:  Reviewed spine anatomy and disease process. Discussed diagnosis and treatment options with the patient today. A shared decision making model was used. The patient's values and choices were respected. The following represents what was discussed and decided upon by the provider and the patient.     -DIAGNOSTIC TESTS:   --did not order any new imaging     -PHYSICAL THERAPY:    -start PT as  planned   Discussed the importance of core strengthening, ROM, stretching exercises with the patient and how each of these entities is important in decreasing pain.  Explained to the patient that the purpose of physical therapy is to teach the patient a home exercise program.  These exercises need to be performed every day in order to decrease pain and prevent future occurrences of pain.    -MEDICATIONS:    -increase gabapentin gradually to 600mg TID  -start flexeril 5-10mg TID PRN  - stop naproxen, start meloxicam 7.5mg every day  -reportedly not cleared for po steroids given hx dm  Discussed multiple medication options today with patient. Discussed risks, side effects, and proper use of medications. Patient verbalized understanding.    -INTERVENTIONS:    -discussed the role of possible cervical spine injections depending on symptoms/future imaging results.    -PATIENT EDUCATION: Total time of 40 minutes, on the day of service, spent with the patient, reviewing the chart, placing orders, and documenting.   -Today we also discussed the issues related to the pros and cons of the current treatment plan.    -FOLLOW-UP:       Advised patient to call the [facility] if symptoms worsen or if they develop red flag symptoms such as numbness, weakness, severe pain uncontrolled by current pain med regimen, or any new or worsening problems controlling bladder and bowel function.   ______________________________________________________________________    SUBJECTIVE:   Elissa Grajeda  is a 48 year old female hx type 2 diabetes, anxiety, depression, neuropathy who presents today for new patient evaluation of neck pain into right arm     symptoms began gradually about 6 months ago with no trauma.  Patient woke up with pain in her right hand.  At first, she thought this was maybe a carpal tunnel, but it began to extend up the arm into the shoulder and into the neck on the right.  It has been persistent and significant since  then.    Pain is at a 6 out of 10 today, does get to a 9 out of 10 out of 10 by the end of the day.  Does not go below 6.  Pain is worse with prolonged sitting, she sits at a desk for work with head extended forward.  It is also worse with position of her neck.  She has noticed over the last month she has had numbness and tingling in the hand and all of the fingers.  Her right arm generally feels weak with lifting, particularly pouring large pitchers or lifting milk jugs. She saw her PCP last week  and was given prescription for naproxen. This has not helped.  Reports she was not recommended to take any p.o. steroids given history of diabetes.   She is on gabapentin chronically for anxiety.  She has been using IcyHot.      She had previous right ulnar nerve surgery 2 years ago and has some chronic mild irritation of her right elbow to palpation.    She is scheduled for physical therapy.  She has not had any previous injections or spine surgeries    Here with her  today     -Treatment to Date:     -Medications:   naproxen  IcyHot  Gabapentin (for anxiety, depression)   Cymbalta    Current Outpatient Medications   Medication     cyclobenzaprine (FLEXERIL) 5 MG tablet     DULoxetine (CYMBALTA) 60 MG capsule     gabapentin (NEURONTIN) 300 MG capsule     gabapentin (NEURONTIN) 300 MG capsule     meloxicam (MOBIC) 7.5 MG tablet     mirtazapine (REMERON) 15 MG tablet     ACCU-CHEK GUIDE TEST STRIPS strips     ARIPiprazole (ABILIFY) 10 MG tablet     atorvastatin (LIPITOR) 20 MG tablet     benztropine (COGENTIN) 0.5 MG tablet     blood glucose meter (GLUCOMETER)     empagliflozin (JARDIANCE) 10 MG TABS tablet     generic lancets (FINGERSTIX LANCETS)     lisinopril (ZESTRIL) 10 MG tablet     metFORMIN (GLUCOPHAGE) 1000 MG tablet     naproxen (NAPROSYN) 500 MG tablet     Current Facility-Administered Medications   Medication     lidocaine 1 % injection 3 mL     triamcinolone (KENALOG-40) injection 40 mg       No Known  Allergies    Past Medical History:   Diagnosis Date     Diabetes (H)      Herniated disc      Spinal stenosis         Patient Active Problem List   Diagnosis     Poorly controlled type 2 diabetes mellitus (H)     Anxiety     Insomnia     Hyperlipidemia     Tobacco abuse     Obesity     Microalbuminuria due to type 2 diabetes mellitus (H)     Benign essential hypertension     Spinal stenosis, lumbar region, with neurogenic claudication     Osteoarthritis of left knee, unspecified osteoarthritis type-- xray Feb 2020     Obesity (BMI 35.0-39.9) with comorbidity (H)       No past surgical history on file.    Family History   Problem Relation Age of Onset     Coronary Artery Disease Father         78       Reviewed past medical, surgical, and family history with patient found on new patient intake packet located in EMR Media tab.     SOCIAL HX: smoker, alcohol use, no heavy drinking or rec drug use    Oswestry (TENZIN) Questionnaire:        3/15/2024     9:17 AM   OSWESTRY DISABILITY INDEX   Count 10   Sum 13   Oswestry Score (%) 26 %       Neck Disability Index:      3/15/2024     9:20 AM   Neck Disability Index (  Ang [employer]. [employer] Jonatan PRINCE. 1991. All rights reserved.; used with permission)   SECTION 1 - PAIN INTENSITY 2   SECTION 2 - PERSONAL CARE 0   SECTION 3 - LIFTING 4   SECTION 4 - READING 1   SECTION 5 - HEADACHES 3   SECTION 6 - CONCENTRATION 2   SECTION 7 - WORK 2   SECTION 8 - DRIVING 0   SECTION 9 - SLEEPING 3   SECTION 10 - RECREATION 2   Count 10   Sum 19   Raw Score: /50 19   Neck Disability Index Score: (%) 38 %          PHQ-2 Score:       4/12/2022     7:37 PM   PHQ-2 ( 1999 [employer])   Q1: Little interest or pleasure in doing things 0   Q2: Feeling down, depressed or hopeless 0   PHQ-2 Score 0   Q1: Little interest or pleasure in doing things Not at all   Q2: Feeling down, depressed or hopeless Not at all   PHQ-2 Score 0          ROS: positive for headache, insomnia, anxiety, depression. Specifically negative  for bowel/bladder dysfunction, balance changes, headache, dizziness, foot drop, fevers, chills, appetite changes, nausea/vomiting, unexplained weight loss. Otherwise 13 systems reviewed are negative. Please see the patient's intake questionnaire from today for details.    OBJECTIVE:  BP (!) 140/88   Pulse 85     PHYSICAL EXAMINATION:  --CONSTITUTIONAL: Vital signs as above. No acute distress. The patient is well nourished and well groomed.  --PSYCHIATRIC: The patient is awake, alert, oriented to person, place, and time, and answering questions appropriately with clear speech. Appropriate mood and affect   --HEENT: Sclera are non-injected. Extraocular muscles are intact. Moist oral mucosa.  --SKIN: Skin over the face, bilateral upper extremities, and posterior torso is clean, dry, intact without rashes.  --RESPIRATORY: Normal rhythm and effort. No abnormal accessory muscle breathing patterns noted.     --GROSS MOTOR: Easily arises from a seated position. Toe walking, heel walking, and tandem gait are normal.      --UPPER EXTREMITY MOTOR TESTING:  Shoulder abduction: right 5/5, left 5/5  Triceps: right 5/5 left 5/5  Biceps:right 5/5  left 5/5,   Hand :  right 5/5  left 5/5,   Intrinsics: right 5/5  left 5/5,   Extensors: right 5/5  left 5/5,     --LOWER EXTREMITY MOTOR TESTING  Hip flexion: right 5/5  left 5/5,   Quads:right 5/5  left 5/5,   Hamstrings: right 5/5  left 5/5,   Dorsiflexion: right 5/5  left 5/5,   Plantarflexion: right 5/5  left 5/5,   EHL: right 5/5  left 5/5,     REFLEXES: 2/4 symmetric triceps, biceps, brachioradialis bilaterally. 2/4 symmetric patellar, achilles reflex bilaterally.  Negative Clonus, Babinski, and Haney's bilaterally.      SENSATION: of the upper and lower extremities is intact to light touch.   --VASCULAR: Warm upper and lower limbs bilaterally.     --CERVICAL SPINE: Inspection reveals no evidence of deformity or swelling. Range of motion is mildly limited in cervical  flexion, extension, lateral rotation, head tilt. No point tenderness to palpation of cervical spine. Positive tenderness to palpation of traps and scaps. No  paraspinal musculature tenderness    --SHOULDERS: some pain with R shoulder ROM but ROM is full. Negative empty can.some tenderness R shoulder     --WRISTS: Full range of motion bilaterally, negative tinel and phalen signs bilaterally.      RESULTS:   Prior medical records from [facility] Everywhere were reviewed today.         IMAGING:  Spine imaging was personally reviewed and interpreted today. The images were shown to the patient and the findings were explained using a spine model.     XR Cervical Spine 2/3 vws    Result Date: 3/12/2024  EXAM: XR CERVICAL SPINE 2/3 VIEWS LOCATION: [facility] DATE: 3/11/2024 INDICATION:  Cervical radiculopathy COMPARISON: None.     IMPRESSION: Normal vertebral body heights. The patient's head is mildly tilted to the left and there is mild broad cervicothoracic dextrocurvature, apex at C6-C7. There is also straightening of the usual cervical lordosis, though without spondylolisthesis. No radiographic evidence for acute fracture or traumatic subluxation. Mild interbody degenerative changes at C5-C6, with early disc space height loss and marginal osteophyte formation. Mild degenerative facet arthropathy in the mid/lower cervical spine, left greater than right. Mild degenerative change at the anterior and right lateral C1-C2 articulations. No prevertebral soft tissue swelling. Visualized lung apices appear unremarkable.          Marita Vernon FNP-C  [facility]  O. [phone]      Again, thank you for allowing me to participate in the care of your patient.        Sincerely,        AGUILA Flaherty CNP

## 2024-03-15 NOTE — PATIENT INSTRUCTIONS
Flexeril 5mg (muscle relaxant medication) has been prescribed today. Please take one three times daily as needed to start. This medication may cause drowsiness. Please do not work or drive while taking this medication until you know how it affects you. If it does make you drowsy, you should only take it before bedtime or at times that you do not have to work/drive.  Can take 2 three times daily if no side effects.     Prescribed Meloxicam 7.5mg today. Please take once daily as prescribed, as needed for pain control as well as to aid in decreasing inflammation.   Take medication with a full glass of water and with food.   *Do not take Advil, Ibuprofen, Aleve, or Naproxen while taking this medication as it can cause organ failure if taken together*  This medication does have risks if taken long-term, these risks include: gastrointestinal irritation, kidney dysfunction, and cardiovascular effects.  We will check your kidney function as needed while you're on this medication.  Stop taking this medication if you have intolerable side effects or blood in the stool.     Prescribed Gabapentin today, 300 mg tablets, to be titrated up to 2 tablets 3 times a day as tolerated for your nerve pain. Please follow Gabapentin dosing chart below.    Gabapentin 300mg Dosing Chart    DATE  MORNING AFTERNOON BEDTIME    Day 1 2 1 2    Day 2 2 1 2    Day 3 2 1 2    Day 4 2 2 2    Day 5 2 2 2    Day 6 2 2 2     Continue medication, taking 2 capsules three times daily    Please call if you have any questions regarding how to take your medication  Clinic Phone # [phone]            Stop your voltaren gel and your naproxen  You can take tylenol over the counter as needed  OK to use your icy hot pro         Start your PT as planned.   Importance of specialized Physical Therapy:     Today, we discussed the importance of core strengthening, ROM, and stretching exercises, and how each of these are key in decreasing pain.   The purpose of  physical therapy is to teach patients a home exercise program individualized to them and their specific health concerns.  These exercises need to be performed every day in order to decrease pain and prevent future occurrences of pain.        ~Please call our [facility] Nurse Navigation line [phone] with any questions or concerns about your treatment plan, if symptoms worsen and you would like to be seen urgently, or if you have any new or worsening numbness, weakness, or problems controlling bladder and bowel function.  ~You are also welcome to contact Marita Vernon via [email or web address], but please be aware that responses to [email or web address] message may take 2-3 days due to the high volume of patients seen in clinic.

## 2024-04-01 ENCOUNTER — THERAPY VISIT (OUTPATIENT)
Dept: PHYSICAL THERAPY | Facility: REHABILITATION | Age: 49
End: 2024-04-01
Attending: STUDENT IN AN ORGANIZED HEALTH CARE EDUCATION/TRAINING PROGRAM
Payer: OTHER GOVERNMENT

## 2024-04-01 DIAGNOSIS — M54.12 CERVICAL RADICULOPATHY: ICD-10-CM

## 2024-04-01 PROCEDURE — 97161 PT EVAL LOW COMPLEX 20 MIN: CPT | Mod: GP | Performed by: PHYSICAL THERAPIST

## 2024-04-01 PROCEDURE — 97110 THERAPEUTIC EXERCISES: CPT | Mod: GP | Performed by: PHYSICAL THERAPIST

## 2024-04-01 NOTE — PROGRESS NOTES
PHYSICAL THERAPY EVALUATION  Type of Visit: Evaluation    See electronic medical record for Abuse and Falls Screening details.    Maryan Bowers presents to physical therapy today with R sided neck pain that is going down her arm to her hands. Noticed this 5-6 months ago. Started with lateral 3 fingers, now it's her whole hand. Then on Saturday she noticed pain on the left side of her neck and it shot down to her fingers (not sure which ones). She notes she can always feel it, the numbness is really intense especially on R side of her neck. Numbness is always there along the outside of her arm and down to her fingers. She has history of ulnar nerve surgery so her elbow is always numb. Was taking Meloxicam for 2.5 weeks, but just stopped taking it Friday as it was causing a lot of stomach issues. She reports she's gotten more headaches within the last few months that start at the base of her R head and wraps to top of her head to forehead. She notes her sleep has been really affected the last few weeks.     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     Relieving factors: TENS unit, icy hot   Aggravating factors: Turning her head, tilting her head side to side    Presenting condition or subjective complaint: Neck pain that goes down my arms making my fingers go numb  Date of onset: 10/01/23    Relevant medical history:   None noted  Dates & types of surgery: Ulnar nerve surgery 11/2022    Prior diagnostic imaging/testing results: X-ray     Prior therapy history for the same diagnosis, illness or injury: No      Prior Level of Function  Transfers:   Ambulation:   ADL:   IADL:     Living Environment  Social support: With a significant other or spouse   Type of home: House   Stairs to enter the home: No       Ramp: Yes   Stairs inside the home: No       Help at home: None  Equipment owned:       Employment: Yes [employer] Customer  service  Hobbies/Interests: Crocheting, and julio paintings    Patient goals for therapy: My crafts  and work without pain and discomfort    Pain assessment:      Objective   CERVICAL SPINE EVALUATION  PAIN:   INTEGUMENTARY (edema, incisions):   POSTURE:  Forward head, rounded shoulders  GAIT:   Weightbearing Status:   Assistive Device(s):   Gait Deviations:   BALANCE/PROPRIOCEPTION:   WEIGHTBEARING ALIGNMENT:   ROM:  Cervical ROM-- Flexion: 20 degrees, pain along posterior neck myke   Extension: 45 degrees, pain along posterior neck myke   Rotation: Right 55 degrees, N/T down R arm;  Left 45 degrees, N/T down L arm   Side-bendin degrees myke with L sided neck pain and N/T down L arm     Shoulder AROM screen: Shoulder flex/ abd/ ER WNL, pulling felt myke shoulders/ neck.  IR (reaching behind back): To belt line, pain down myke shoulders/ arms      MYOTOMES:   STRENGTH:   Shoulder flexion: 5/5 myke  Shoulder abd: 4+/5 right, 4/5 left  Elbow flexion: 4+/5 myke  Elbow extension: 4/5 myke  Wrist flexion: 5/5 myke  Wrist extension: 5/5 myke  Finger abd: 5/5 myke  Finger add: 4/5 myke  Finger flexion: 4+/5 myke    DNF strength: 15 seconds    DTR S:   CORD SIGNS: Haney negative L, Haney negative R  DERMATOMES:   Cervical spine; diminished to light touch throughout R>L. WNL C8, T1 myek, otherwise diminished    NEURAL TENSION:  Distraction: (+)    Spurling's: (+) myke  FLEXIBILITY:  Decreased myke SCM, UT, scalene flexibility    SPECIAL TESTS:  Cervical rotation-flexion: Incr pain in neutral; no incr pain with R or L rotation     PALPATION:  TTP throughout entire C-spine; taut bands noted myke cervical paraspinals, SCM, UT, scalenes  SPINAL SEGMENTAL CONCLUSIONS:  PA mobility WNL; decreased segmental mobility myke C3, C4, C5, C6 via lateral glide assessment      Assessment & Plan   CLINICAL IMPRESSIONS  Medical Diagnosis: Cervical radiculopathy    Treatment Diagnosis: Chronic R>L myke cervical radiculopathy with mobility deficits    Impression/Assessment: Patient is a 48 year old female with R sided neck pain with R paresthesias for 5-6 months & recent L sided neck pain with paresthesias since Saturday, no specific JUAN.  Previously thought she had carpal tunnel, but this was ruled out by EMG. She has also had ulnar nerve surgery in 2022 which has affected the sensation along her R elbow. However, she does present with diminished sensation to light touch throughout her Ue's R>L, minimally decreased UE strength, (+) neural tension testing of her C-spine, and decreased flexibility & hypomobility of her spine. Overall her presentation is consistent with bilateral R>L cervical radiculopathy. These impairments interfere with their ability to perform self care tasks, work tasks, recreational activities, and household chores as compared to previous level of function.     Clinical Decision Making (Complexity):  Clinical Presentation: Evolving/Changing  Clinical Presentation Rationale: based on medical and personal factors listed in PT evaluation  Clinical Decision Making (Complexity): Low complexity    PLAN OF CARE  Treatment Interventions:  Modalities: Dry Needling, E-stim, Mechanical Traction  Interventions: Manual Therapy, Neuromuscular Re-education, Therapeutic Activity, Therapeutic Exercise, Self-Care/Home Management    Long Term Goals     PT Goal 1  Goal Identifier: HEP  Goal Description: In 45 days, pt will demonstrate understanding of and independence with her HEP  Goal Progress: Initial  Target Date: 05/16/24  PT Goal 2  Goal Identifier: Headaches  Goal Description: In 90 days, pt will report 0 headaches for 1 consecutive week for improved ability to carry out required daily household & work tasks & enjoy leisure time.  Goal Progress: Initial  Target Date: 06/30/24  PT Goal 3  Goal Identifier: Sleep  Goal Description: In 90 days, pt will sleep through the night undisturbed from neck pain for improved sleep hygiene.  Goal Progress:  Initial  Target Date: 06/30/24  PT Goal 4  Goal Identifier: Work  Goal Description: In 90 days, pt will be able to sit at her computer to work a full 8 hour work day (with breaks) with <2/10 myke neck pain and no radicular sx in order to carry out required job duties.  Goal Progress: Initial  Target Date: 06/30/24      Frequency of Treatment: 1x/week  Duration of Treatment: 90 days    Recommended Referrals to Other Professionals:   Education Assessment:   Learner/Method: Patient    Risks and benefits of evaluation/treatment have been explained.   Patient/Family/caregiver agrees with Plan of Care.     Evaluation Time:     PT Eval, Low Complexity Minutes (45307): 35       Signing Clinician: Adia So PT

## 2024-04-08 ENCOUNTER — THERAPY VISIT (OUTPATIENT)
Dept: PHYSICAL THERAPY | Facility: REHABILITATION | Age: 49
End: 2024-04-08
Attending: STUDENT IN AN ORGANIZED HEALTH CARE EDUCATION/TRAINING PROGRAM
Payer: OTHER GOVERNMENT

## 2024-04-08 DIAGNOSIS — M54.12 CERVICAL RADICULOPATHY: Primary | ICD-10-CM

## 2024-04-08 PROCEDURE — 97110 THERAPEUTIC EXERCISES: CPT | Mod: GP | Performed by: PHYSICAL THERAPIST

## 2024-04-08 PROCEDURE — 97140 MANUAL THERAPY 1/> REGIONS: CPT | Mod: GP | Performed by: PHYSICAL THERAPIST

## 2024-04-08 PROCEDURE — 97112 NEUROMUSCULAR REEDUCATION: CPT | Mod: GP | Performed by: PHYSICAL THERAPIST

## 2024-04-15 ENCOUNTER — THERAPY VISIT (OUTPATIENT)
Dept: PHYSICAL THERAPY | Facility: REHABILITATION | Age: 49
End: 2024-04-15
Attending: STUDENT IN AN ORGANIZED HEALTH CARE EDUCATION/TRAINING PROGRAM
Payer: OTHER GOVERNMENT

## 2024-04-15 DIAGNOSIS — M54.12 CERVICAL RADICULOPATHY: Primary | ICD-10-CM

## 2024-04-15 PROCEDURE — 97110 THERAPEUTIC EXERCISES: CPT | Mod: GP | Performed by: PHYSICAL THERAPIST

## 2024-04-15 PROCEDURE — 97140 MANUAL THERAPY 1/> REGIONS: CPT | Mod: GP | Performed by: PHYSICAL THERAPIST

## 2024-04-15 PROCEDURE — 97112 NEUROMUSCULAR REEDUCATION: CPT | Mod: GP | Performed by: PHYSICAL THERAPIST

## 2024-04-22 ENCOUNTER — THERAPY VISIT (OUTPATIENT)
Dept: PHYSICAL THERAPY | Facility: REHABILITATION | Age: 49
End: 2024-04-22
Payer: OTHER GOVERNMENT

## 2024-04-22 DIAGNOSIS — M54.12 CERVICAL RADICULOPATHY: Primary | ICD-10-CM

## 2024-04-22 PROCEDURE — 97110 THERAPEUTIC EXERCISES: CPT | Mod: GP | Performed by: PHYSICAL THERAPIST

## 2024-04-22 PROCEDURE — 97140 MANUAL THERAPY 1/> REGIONS: CPT | Mod: GP | Performed by: PHYSICAL THERAPIST

## 2024-04-29 ENCOUNTER — THERAPY VISIT (OUTPATIENT)
Dept: PHYSICAL THERAPY | Facility: REHABILITATION | Age: 49
End: 2024-04-29
Payer: OTHER GOVERNMENT

## 2024-04-29 DIAGNOSIS — M54.12 CERVICAL RADICULOPATHY: Primary | ICD-10-CM

## 2024-04-29 PROCEDURE — 97012 MECHANICAL TRACTION THERAPY: CPT | Mod: GP | Performed by: PHYSICAL THERAPIST

## 2024-04-29 PROCEDURE — 97110 THERAPEUTIC EXERCISES: CPT | Mod: GP | Performed by: PHYSICAL THERAPIST

## 2024-05-07 ENCOUNTER — THERAPY VISIT (OUTPATIENT)
Dept: PHYSICAL THERAPY | Facility: REHABILITATION | Age: 49
End: 2024-05-07
Payer: OTHER GOVERNMENT

## 2024-05-07 DIAGNOSIS — M54.12 CERVICAL RADICULOPATHY: Primary | ICD-10-CM

## 2024-05-07 PROCEDURE — 97012 MECHANICAL TRACTION THERAPY: CPT | Mod: GP | Performed by: PHYSICAL THERAPIST

## 2024-05-07 PROCEDURE — 97110 THERAPEUTIC EXERCISES: CPT | Mod: GP | Performed by: PHYSICAL THERAPIST

## 2024-05-12 ENCOUNTER — HEALTH MAINTENANCE LETTER (OUTPATIENT)
Age: 49
End: 2024-05-12

## 2024-05-31 ENCOUNTER — THERAPY VISIT (OUTPATIENT)
Dept: PHYSICAL THERAPY | Facility: REHABILITATION | Age: 49
End: 2024-05-31
Payer: OTHER GOVERNMENT

## 2024-05-31 DIAGNOSIS — M54.12 CERVICAL RADICULOPATHY: Primary | ICD-10-CM

## 2024-05-31 PROCEDURE — 97110 THERAPEUTIC EXERCISES: CPT | Mod: GP | Performed by: PHYSICAL THERAPIST

## 2024-05-31 PROCEDURE — 97140 MANUAL THERAPY 1/> REGIONS: CPT | Mod: GP | Performed by: PHYSICAL THERAPIST

## 2024-05-31 NOTE — PROGRESS NOTES
DISCHARGE  Reason for Discharge: Very minimal to no improvement in symptoms with physical therapy.     Equipment Issued: none    Discharge Plan: Patient to continue home program. Follow-up at spine clinic.     Referring Provider:  Marielena Galaviz       05/31/24 0500   Appointment Info   Signing clinician's name / credentials Adia So PT   Total/Authorized Visits 12   Visits Used 8   Medical Diagnosis Cervical radiculopathy   PT Tx Diagnosis Chronic R>L myke cervical radiculopathy with mobility deficits   Progress Note/Certification   Onset of illness/injury or Date of Surgery 10/01/23   Therapy Frequency 1x/week   Predicted Duration 90 days   Progress Note Due Date 05/16/24   Progress Note Completed Date 04/01/24   GOALS   PT Goals 3;2;4   PT Goal 1   Goal Identifier HEP   Goal Description In 45 days, pt will demonstrate understanding of and independence with her HEP   Goal Progress Initial   Target Date 05/16/24   PT Goal 2   Goal Identifier Headaches   Goal Description In 90 days, pt will report 0 headaches for 1 consecutive week for improved ability to carry out required daily household & work tasks & enjoy leisure time.   Goal Progress Not met, continues to have daily headaches   Target Date 06/30/24   PT Goal 3   Goal Identifier Sleep   Goal Description In 90 days, pt will sleep through the night undisturbed from neck pain for improved sleep hygiene.   Goal Progress Not met, continues to have sleep disturbances from her neck pain at night   Target Date 06/30/24   PT Goal 4   Goal Identifier Work   Goal Description In 90 days, pt will be able to sit at her computer to work a full 8 hour work day (with breaks) with <2/10 myke neck pain and no radicular sx in order to carry out required job duties.   Goal Progress Not met   Target Date 06/30/24   Subjective Report   Subjective Report Elissa notes the relief of myke hand N/T after last session lasted for about 2 hours and then returned. She has been on  "vacation this whole week so pain/ sx haven't been too bad. She did cut the grass last night which re-aggravated her neck pain a little more.   Objective Measures   Objective Measures Objective Measure 1;Objective Measure 2;Objective Measure 3;Objective Measure 4   Objective Measure 1   Objective Measure 4/8/24 Ulnar ULTT   Details positive L   Objective Measure 2   Objective Measure 4/8/24 Radial ULTT   Details positive R   Objective Measure 3   Objective Measure 4/8/24 Median ULTT   Details negative B   Objective Measure 4   Objective Measure  strength 5/7/2024   PT Modalities   PT Modalities Mechanical Traction   Mechanical Traction   Treatment Detail Hilaria cervical traction device   Traction -Type Cervical   Position supine   Duration 20 min   Max poundage 13 lbs   Min poundage 8 lbs   Hold Time 5 minutes   Rest Time 1 minute   Patient Response/Progress Significantly decreased R neck & arm N/T. Notes her hand \"felt normal\" during & after   Treatment Interventions (PT)   Interventions Therapeutic Procedure/Exercise;Neuromuscular Re-education;Manual Therapy   Therapeutic Procedure/Exercise   Therapeutic Procedures: strength, endurance, ROM, flexibility minutes (93437) 9   Therapeutic Procedures Ther Proc 2;Ther Proc 3;Ther Proc 4;Ther Proc 5;Ther Proc 6   Ther Proc 1 UBE + subjective 7 min; no paresthesias   Ther Proc 1 - Details Upper trap stretch seated: not today   Ther Proc 2 [place] and arrow stretch: verbal review   Ther Proc 2 - Details Supine chin tucks: not today   Ther Proc 3 Supine chin tucks: not today   Ther Proc 4 Seated scapular retraction + shoulder ER: not today   Ther Proc 4 - Details Low rows/ W rows: not today   Skilled Intervention Discharge planning, discussed further imaging & potential for injection-- has follow-up on Monday with spine clinic   Patient Response/Progress Demonstrated understanding, agreed with POC   Neuromuscular Re-education   Neuromuscular Re-education Neuro Re-ed 2 "   Neuro Re-ed 1 Standing ular nn. glide L 1 x 20; progressed ROM (half before irritable), reports mild resolution of radicular sx   Neuro Re-ed 1 - Details Standing ular nn. glide R 1 x 20; full arc achievable, reports mild resolution of radicular sx   Neuro Re-ed 2 Standing prox radial nn. glide R UE 2 x 20   Skilled Intervention Not today   Patient Response/Progress proximal ulnar nn. glides (can tolerate half arc R, full arc L); removed head tilt for radial nn. glides and pt tolerated well.   Manual Therapy   Manual Therapy: Mobilization, MFR, MLD, friction massage minutes (49644) 20   Manual Therapy Manual Therapy 2   Manual Therapy 1 Bilateral Suboccipital TPR (mild pressure)   Manual Therapy 1 - Details Bilateral Suboccipital TPR + manual intermittent cervical traction   Manual Therapy 2 STM R UT & manual UT stretch with OP from therapist   Patient Response/Progress Felt looser after MT   Manual Therapy 2 - Details Pt supine for all exercises   Education   Learner/Method Patient   Plan   Home program see PTRx   Plan for next session Cervical traction again pending long term response from last session, review HEP & nerve mildred, MT as needed   Comments   Comments Overall Elissa has not demonstrated much, if any, improvement from SOC until now. She was feeling relief with PT after our sessions, but the relief did not last for more than a few hours. She continues to have pain throughout her work day and while sleeping & she has daily headaches. She has a follow-up with spine clinic on Monday which will be a good next step. At this time, Elissa will be discharged from PT given the lack of progress with therapy thus far. Elissa agrees with POC.   Total Session Time   Timed Code Treatment Minutes 29   Total Treatment Time (sum of timed and untimed services) 29

## 2024-06-05 ENCOUNTER — OFFICE VISIT (OUTPATIENT)
Dept: PHYSICAL MEDICINE AND REHAB | Facility: CLINIC | Age: 49
End: 2024-06-05
Payer: OTHER GOVERNMENT

## 2024-06-05 VITALS — SYSTOLIC BLOOD PRESSURE: 131 MMHG | DIASTOLIC BLOOD PRESSURE: 85 MMHG | HEART RATE: 118 BPM

## 2024-06-05 DIAGNOSIS — M54.12 CERVICAL RADICULOPATHY: Primary | ICD-10-CM

## 2024-06-05 PROCEDURE — 99213 OFFICE O/P EST LOW 20 MIN: CPT | Performed by: NURSE PRACTITIONER

## 2024-06-05 RX ORDER — NAPROXEN 500 MG/1
500 TABLET ORAL 2 TIMES DAILY WITH MEALS
Qty: 60 TABLET | Refills: 0 | Status: SHIPPED | OUTPATIENT
Start: 2024-06-05

## 2024-06-05 RX ORDER — CYCLOBENZAPRINE HCL 10 MG
10 TABLET ORAL 3 TIMES DAILY PRN
Qty: 45 TABLET | Refills: 0 | Status: SHIPPED | OUTPATIENT
Start: 2024-06-05

## 2024-06-05 ASSESSMENT — PAIN SCALES - GENERAL: PAINLEVEL: EXTREME PAIN (8)

## 2024-06-05 NOTE — PATIENT INSTRUCTIONS
Schedule an EMG (nerve test) with Dr Hannah.      Imaging (Xray, CT, or MRI) has been ordered today.   Radiology will call you to schedule. Please call below if you do not hear from them in the next couple of days.     [facility] Scheduling:  Please call [phone] to schedule your image(s) (select option #1).    There are 3 different locations:    [facility]  [address] [address]    [facility] [place]  [address], [address]   [place] [facility] [place] [address]    [facility]  [address] [address]       Labs were ordered today: please go as a walk-in to [facility] to have these drawn.  If your kidney function looks good, would be safe to start :    NAPROXEN (sent to your pharmacy today) Please take as prescribed, as needed for pain control as well as to aid in decreasing inflammation.   Take medication with a full glass of water and with food.   *Do not take Advil, Ibuprofen, Aleve, or Naproxen while taking this medication as it can cause organ failure if taken together*  This medication does have risks if taken long-term, these risks include: gastrointestinal irritation, kidney dysfunction, and cardiovascular effects.  We will check your kidney function as needed while you're on this medication.  Stop taking this medication if you have intolerable side effects or blood in the stool.       Flexeril 10mg (muscle relaxant medication) has been prescribed today. Please take one tablet 3x daily as needed. This medication may cause drowsiness. Please do not work or drive while taking this medication until you know how it affects you. If it does make you drowsy, you should only take it before bedtime or at times that you do not have to work/drive.     Continue your gabapentin     Radicular Pain    Radicular pain in either the arm or leg is usually from a bulging disc in the spine. A portion of the herniated disc may  "press against the nerves as the nerves exit the spine. This causes pain which is felt down the arm or leg. Other causes of radicular pain may include:  Fractures.  Heart disease.  Cancer.  An abnormal and usually degenerative state of the nervous system or nerves (neuropathy).    In most cases, radicular pain is treated without imaging unless symptoms do not start to improve. If that is the case, your provider may order a CT or MRI scan to determine the cause.     Nerves in the cervical spine (neck) may cause radicular pain into the outer shoulder and down the arm. It can spread down to the thumb and fingers. The symptoms vary depending on which nerve root has been affected. In most cases, radicular pain improves with conservative treatment such as physical therapy, cervical traction, medications, and epidural steroid injections. A program for neck rehabilitation with stretching and strengthening exercises is an important part of management. Treatment may take months, and surgery may be considered as a last resort if the symptoms do not improve.    Nerves in the lumbar spine (lower back) may cause radicular pain into the hip and down the leg. The commonly used term for this type of pain is \"sciatica\". Conservative treatment is also recommended for this problem. Most patients feel better after 2 to 4 weeks of rest and other supportive care. You should avoid bending, lifting, and all other activities which can make your pain worse. Physical therapy, traction, medications, and epidural steroid injections can be good options to help with your recovery. A program for back injury rehabilitation with stretching and strengthening exercises is an important part of management. Surgery may be considered as a last resort if symptoms do not improve with conservative treatment.     You may take over-the-counter or prescription medicines for pain, discomfort, or fever as directed by your caregiver. Muscle relaxants may help by " relieving more severe pain and spasm. Neuropathic medication (such as gabapentin, lyrica, or cymbalta) can help decrease your symptoms of nerve pain as well. Cold or massage can also give significant relief. Spinal manipulation is not recommended as it can increase the degree of disc protrusion. We do not recommend taking narcotic medication such as percocet, oxycodone, norco, dilaudid, or others unless pain is severe and not controlled with any other oral options.    Epidural steroid injections are often effective treatment for radicular pain. These injections deliver strong anti-inflammatory medicine to the area directly around the nerve root in the space between your back bones (vertebrae). Your provider can give you more information about steroid injections. These injections are most effective when given within two weeks of the onset of acute pain. You should see your provider for follow up care as recommended.     In most cases, radicular pain is treated without imaging unless symptoms do not start to improve. If that is the case, your provider may order a CT or MRI scan to determine the cause.     SEEK IMMEDIATE MEDICAL CARE IF:  You develop increased pain, weakness, or numbness in your arm or leg.  You develop difficulty with bladder or bowel control.  You develop abdominal pain.    Document Released: 01/25/2006 Document Revised: 03/11/2013 Document Reviewed: 04/12/2010  Patient Information  2013 [employer].       ~Please call our [facility] Nurse Navigation line [phone] with any questions or concerns about your treatment plan, if symptoms worsen and you would like to be seen urgently, or if you have any new or worsening numbness, weakness, or problems controlling bladder and bowel function.  ~You are also welcome to contact Marita Vernon via Videobot, but please be aware that responses to Videobot message may take 2-3 days due to the high volume of patients seen in clinic.

## 2024-06-05 NOTE — PROGRESS NOTES
ASSESSMENT: Elissa Grajeda is a 48 year old female presents for consultation at the request of PCP Umer Vega, who presents today for follow up patient evaluation of :     -cervical radiculopathy    Worsening numbness of R arm and headaches. Recommend EMG R UE (given hx ulnar decompression), and Cspine MRI.  Continue PT exercises. Basic metabolic panel. If wnl, plan to start Naproxen 500mg BID PRN. Trial flexeril 10mg TID PRN. Continue gabapentin 900mg TID. She will follow up in person to review EMG and MRI        3/15/2024     9:17 AM   OSWESTRY DISABILITY INDEX   Count 10   Sum 13   Oswestry Score (%) 26 %            Diagnoses and all orders for this visit:  Cervical radiculopathy  -     cyclobenzaprine (FLEXERIL) 10 MG tablet; Take 1 tablet (10 mg) by mouth 3 times daily as needed for muscle spasms  -     naproxen (NAPROSYN) 500 MG tablet; Take 1 tablet (500 mg) by mouth 2 times daily (with meals)  -     EMG; Future  -     MR Cervical Spine w/o Contrast; Future  -     Basic metabolic panel; Future         PLAN:  Reviewed spine anatomy and disease process. Discussed diagnosis and treatment options with the patient today. A shared decision making model was used. The patient's values and choices were respected. The following represents what was discussed and decided upon by the provider and the patient.     -DIAGNOSTIC TESTS:   --EMG R UE (hx ulnar surgery)  --Cervical MRI without contrast    -PHYSICAL THERAPY:    -completed PT  Discussed the importance of core strengthening, ROM, stretching exercises with the patient and how each of these entities is important in decreasing pain.  Explained to the patient that the purpose of physical therapy is to teach the patient a home exercise program.  These exercises need to be performed every day in order to decrease pain and prevent future occurrences of pain.    -MEDICATIONS:    -c/w gabapentin 900mg TID  -start flexeril 10 (improvement w/ 5, did not try 10)  -  naproxen 500mg  -did not tolerate meloxicam  -reportedly not cleared for po steroids given hx dm  Discussed multiple medication options today with patient. Discussed risks, side effects, and proper use of medications. Patient verbalized understanding.    -INTERVENTIONS:    -discussed the role of possible cervical spine injections depending on symptoms/future imaging results.    -PATIENT EDUCATION: Total time of 20 minutes, on the day of service, spent with the patient, reviewing the chart, placing orders, and documenting.   -Today we also discussed the issues related to the pros and cons of the current treatment plan.    -FOLLOW-UP:   return in person for imaging review    Advised patient to call the [facility] if symptoms worsen or if they develop red flag symptoms such as numbness, weakness, severe pain uncontrolled by current pain med regimen, or any new or worsening problems controlling bladder and bowel function.   ______________________________________________________________________    SUBJECTIVE:     She has finished PT without improvement. R arm is more numb. The arm feels weak. Having more neck pain and headaches. Headache on R side arches above the R ear.  Flexeril 5mg was helpful somewhat, she is out of it . Did not try 10mg  Meloxicam 7.5 gave her a lot of heartburn - could not tolerate that - stopped it.   Gabapentin 3 3x per day     Per previous visit with updated meds list  HPI  Elissa Grajeda  is a 48 year old female hx type 2 diabetes, anxiety, depression, neuropathy who presents today for new patient evaluation of neck pain into right arm     symptoms began gradually about 6 months ago with no trauma.  Patient woke up with pain in her right hand.  At first, she thought this was maybe a carpal tunnel, but it began to extend up the arm into the shoulder and into the neck on the right.  It has been persistent and significant since then.    Pain is at a 6 out of 10 today, does get to a 9 out of  10 out of 10 by the end of the day.  Does not go below 6.  Pain is worse with prolonged sitting, she sits at a desk for work with head extended forward.  It is also worse with position of her neck.  She has noticed over the last month she has had numbness and tingling in the hand and all of the fingers.  Her right arm generally feels weak with lifting, particularly pouring large pitchers or lifting milk jugs. She saw her PCP last week  and was given prescription for naproxen. This has not helped.  Reports she was not recommended to take any p.o. steroids given history of diabetes.   She is on gabapentin chronically for anxiety.  She has been using IcyHot.      She had previous right ulnar nerve surgery 2 years ago and has some chronic mild irritation of her right elbow to palpation.    She is scheduled for physical therapy.  She has not had any previous injections or spine surgeries    Here with her  today     -Treatment to Date:     Physical Therapy  -full course 2024, discharged    -Medications:   naproxen  IcyHot  Gabapentin (for anxiety, depression)   Cymbalta  Flexeril 5mg  Meloxicam        Current Outpatient Medications   Medication Sig Dispense Refill    cyclobenzaprine (FLEXERIL) 10 MG tablet Take 1 tablet (10 mg) by mouth 3 times daily as needed for muscle spasms 45 tablet 0    naproxen (NAPROSYN) 500 MG tablet Take 1 tablet (500 mg) by mouth 2 times daily (with meals) 60 tablet 0    ACCU-CHEK GUIDE TEST STRIPS strips [ACCU-CHEK GUIDE TEST STRIPS STRIPS] USE TO TEST TWICE DAILY 200 strip 3    ARIPiprazole (ABILIFY) 10 MG tablet [ARIPIPRAZOLE (ABILIFY) 10 MG TABLET] Take 10 mg by mouth daily.       atorvastatin (LIPITOR) 20 MG tablet [ATORVASTATIN (LIPITOR) 20 MG TABLET] TAKE 1 TABLET BY MOUTH EVERY DAY 90 tablet 3    benztropine (COGENTIN) 0.5 MG tablet [BENZTROPINE (COGENTIN) 0.5 MG TABLET] TAKE 1 TABLET BY MOUTH EVERYDAY AT BEDTIME      blood glucose meter (GLUCOMETER) [BLOOD GLUCOSE METER  (GLUCOMETER)] Test TWICE daily. Dispense glucometer brand per patient's insurance at pharmacy discretion. 1 each 0    cyclobenzaprine (FLEXERIL) 5 MG tablet Take 1-2 tablets (5-10 mg) by mouth 3 times daily as needed for muscle spasms 45 tablet 0    DULoxetine (CYMBALTA) 60 MG capsule [DULOXETINE (CYMBALTA) 60 MG CAPSULE] Take 120 mg by mouth daily.       empagliflozin (JARDIANCE) 10 MG TABS tablet Take 1 tablet (10 mg) by mouth daily 90 tablet 3    gabapentin (NEURONTIN) 300 MG capsule Take 2 capsules (600 mg) by mouth 3 times daily 180 capsule 0    generic lancets (FINGERSTIX LANCETS) [GENERIC LANCETS (FINGERSTIX LANCETS)] Test TWICE daily. Dispense brand per patient's insurance at pharmacy discretion. 100 each 11    lisinopril (ZESTRIL) 10 MG tablet [LISINOPRIL (PRINIVIL,ZESTRIL) 10 MG TABLET] TAKE 1 TABLET BY MOUTH EVERY DAY 90 tablet 3    meloxicam (MOBIC) 7.5 MG tablet Take 1 tablet (7.5 mg) by mouth daily 30 tablet 0    metFORMIN (GLUCOPHAGE) 1000 MG tablet [METFORMIN (GLUCOPHAGE) 1000 MG TABLET] TAKE 1 TABLET BY MOUTH TWICE A DAY WITH MEALS 180 tablet 3    mirtazapine (REMERON) 15 MG tablet [MIRTAZAPINE (REMERON) 15 MG TABLET] TAKE 1 TABLET BY MOUTH EVERYDAY AT BEDTIME      naproxen (NAPROSYN) 500 MG tablet Take 1 tablet (500 mg) by mouth 2 times daily (with meals) 30 tablet 1     Current Facility-Administered Medications   Medication Dose Route Frequency Provider Last Rate Last Admin    lidocaine 1 % injection 3 mL  3 mL   Marielena Galaviz DO   3 mL at 12/11/23 1615    triamcinolone (KENALOG-40) injection 40 mg  40 mg   Marielena Galaviz, DO   40 mg at 12/11/23 1615       No Known Allergies    Past Medical History:   Diagnosis Date    Diabetes (H)     Herniated disc     Spinal stenosis         Patient Active Problem List   Diagnosis    Poorly controlled type 2 diabetes mellitus (H)    Anxiety    Insomnia    Hyperlipidemia    Tobacco abuse    Obesity    Microalbuminuria due to type 2 diabetes  mellitus (H)    Benign essential hypertension    Spinal stenosis, lumbar region, with neurogenic claudication    Osteoarthritis of left knee, unspecified osteoarthritis type-- xray Feb 2020    Obesity (BMI 35.0-39.9) with comorbidity (H)       No past surgical history on file.    Family History   Problem Relation Age of Onset    Coronary Artery Disease Father         78       Reviewed past medical, surgical, and family history with patient found on new patient intake packet located in EMR Media tab.     SOCIAL HX: smoker, alcohol use, no heavy drinking or rec drug use    Oswestry (TENZIN) Questionnaire:        3/15/2024     9:17 AM   OSWESTRY DISABILITY INDEX   Count 10   Sum 13   Oswestry Score (%) 26 %       Neck Disability Index:      4/1/2024     6:24 AM   Neck Disability Index (  [employer]. 1991. All rights reserved.; used with permission)   SECTION 1 - PAIN INTENSITY 2   SECTION 2 - PERSONAL CARE 1   SECTION 4 - READING 2   SECTION 5 - HEADACHES 3   SECTION 6 - CONCENTRATION 2   SECTION 7 - WORK 2   SECTION 9 - SLEEPING 3   SECTION 10 - RECREATION 2   Count 8   Sum 17   Raw Score: /50 21.25   Neck Disability Index Score: (%) 42.5 %          PHQ-2 Score:       4/12/2022     7:37 PM   PHQ-2 ( 1999 [employer])   Q1: Little interest or pleasure in doing things 0   Q2: Feeling down, depressed or hopeless 0   PHQ-2 Score 0   Q1: Little interest or pleasure in doing things Not at all   Q2: Feeling down, depressed or hopeless Not at all   PHQ-2 Score 0          ROS: positive for headache, insomnia, anxiety, depression. Specifically negative for bowel/bladder dysfunction, balance changes, headache, dizziness, foot drop, fevers, chills, appetite changes, nausea/vomiting, unexplained weight loss. Otherwise 13 systems reviewed are negative. Please see the patient's intake questionnaire from today for details.    OBJECTIVE:  /85   Pulse 118     PHYSICAL EXAMINATION:  --CONSTITUTIONAL: Vital signs as above. No  acute distress. The patient is well nourished and well groomed.  --PSYCHIATRIC: The patient is awake, alert, oriented to person, place, and time, and answering questions appropriately with clear speech. Appropriate mood and affect   --HEENT: Sclera are non-injected. Extraocular muscles are intact. Moist oral mucosa.  --SKIN: Skin over the face, bilateral upper extremities, and posterior torso is clean, dry, intact without rashes.  --RESPIRATORY: Normal rhythm and effort. No abnormal accessory muscle breathing patterns noted.     --GROSS MOTOR: Easily arises from a seated position. Toe walking, heel walking, and tandem gait are normal.      --UPPER EXTREMITY MOTOR TESTING:  Shoulder abduction: right 5/5, left 5/5  Triceps: right 5/5 left 5/5  Biceps:right 5/5  left 5/5,   Hand :  right 5/5  left 5/5,   Intrinsics: right 5/5  left 5/5,   Extensors: right 5/5  left 5/5,     --LOWER EXTREMITY MOTOR TESTING  Hip flexion: right 5/5  left 5/5,   Quads:right 5/5  left 5/5,   Hamstrings: right 5/5  left 5/5,   Dorsiflexion: right 5/5  left 5/5,   Plantarflexion: right 5/5  left 5/5,   EHL: right 5/5  left 5/5,     REFLEXES: 2/4 symmetric triceps, biceps, brachioradialis bilaterally. 2/4 symmetric patellar, achilles reflex bilaterally.  Negative Clonus, Babinski, and Haney's bilaterally.      SENSATION: is decreased throughout the right upper extremity compared to the left. Some sensory loss right foot/leg as well. Otherwise intact throughout the left upper and lower extremities  --VASCULAR: Warm upper and lower limbs bilaterally.     --CERVICAL SPINE: Inspection reveals no evidence of deformity or swelling. Range of motion is mildly limited in cervical flexion, extension, lateral rotation, head tilt. No point tenderness to palpation of cervical spine. Positive tenderness to palpation of traps and scaps. No  paraspinal musculature tenderness    Does have R occipital tenderness    --SHOULDERS: some pain with R shoulder  ROM but ROM is full. Negative empty can. some tenderness R shoulder     --WRISTS: Full range of motion bilaterally, negative tinel and phalen signs bilaterally.      RESULTS:   Prior medical records from [facility]where were reviewed today.         IMAGING:  Spine imaging was personally reviewed and interpreted today. The images were shown to the patient and the findings were explained using a spine model.     XR Cervical Spine 2/3 vws    Result Date: 3/12/2024  EXAM: XR CERVICAL SPINE 2/3 VIEWS LOCATION: [facility] DATE: 3/11/2024 INDICATION:  Cervical radiculopathy COMPARISON: None.     IMPRESSION: Normal vertebral body heights. The patient's head is mildly tilted to the left and there is mild broad cervicothoracic dextrocurvature, apex at C6-C7. There is also straightening of the usual cervical lordosis, though without spondylolisthesis. No radiographic evidence for acute fracture or traumatic subluxation. Mild interbody degenerative changes at C5-C6, with early disc space height loss and marginal osteophyte formation. Mild degenerative facet arthropathy in the mid/lower cervical spine, left greater than right. Mild degenerative change at the anterior and right lateral C1-C2 articulations. No prevertebral soft tissue swelling. Visualized lung apices appear unremarkable.          Marita Vernon FNP-C  [facility]  O. [phone]

## 2024-06-05 NOTE — LETTER
6/5/2024      Elissa Grajeda  [address]  [place] [address]      Dear Colleague,    Thank you for referring your patient, Elissa Grajeda, to the [facility]ERY. Please see a copy of my visit note below.    ASSESSMENT: Elissa Grajeda is a 48 year old female presents for consultation at the request of PCP Umre Vega, who presents today for follow up patient evaluation of :     -cervical radiculopathy    Worsening numbness of R arm and headaches. Recommend EMG R UE (given hx ulnar decompression), and Cspine MRI.  Continue PT exercises. Basic metabolic panel. If wnl, plan to start Naproxen 500mg BID PRN. Trial flexeril 10mg TID PRN. Continue gabapentin 900mg TID. She will follow up in person to review EMG and MRI        3/15/2024     9:17 AM   OSWESTRY DISABILITY INDEX   Count 10   Sum 13   Oswestry Score (%) 26 %            Diagnoses and all orders for this visit:  Cervical radiculopathy  -     cyclobenzaprine (FLEXERIL) 10 MG tablet; Take 1 tablet (10 mg) by mouth 3 times daily as needed for muscle spasms  -     naproxen (NAPROSYN) 500 MG tablet; Take 1 tablet (500 mg) by mouth 2 times daily (with meals)  -     EMG; Future  -     MR Cervical Spine w/o Contrast; Future  -     Basic metabolic panel; Future         PLAN:  Reviewed spine anatomy and disease process. Discussed diagnosis and treatment options with the patient today. A shared decision making model was used. The patient's values and choices were respected. The following represents what was discussed and decided upon by the provider and the patient.     -DIAGNOSTIC TESTS:   --EMG R UE (hx ulnar surgery)  --Cervical MRI without contrast    -PHYSICAL THERAPY:    -completed PT  Discussed the importance of core strengthening, ROM, stretching exercises with the patient and how each of these entities is important in decreasing pain.  Explained to the patient that the purpose of physical therapy is to teach the  patient a home exercise program.  These exercises need to be performed every day in order to decrease pain and prevent future occurrences of pain.    -MEDICATIONS:    -c/w gabapentin 900mg TID  -start flexeril 10 (improvement w/ 5, did not try 10)  - naproxen 500mg  -did not tolerate meloxicam  -reportedly not cleared for po steroids given hx dm  Discussed multiple medication options today with patient. Discussed risks, side effects, and proper use of medications. Patient verbalized understanding.    -INTERVENTIONS:    -discussed the role of possible cervical spine injections depending on symptoms/future imaging results.    -PATIENT EDUCATION: Total time of 20 minutes, on the day of service, spent with the patient, reviewing the chart, placing orders, and documenting.   -Today we also discussed the issues related to the pros and cons of the current treatment plan.    -FOLLOW-UP:   return in person for imaging review    Advised patient to call the Spine Center if symptoms worsen or if they develop red flag symptoms such as numbness, weakness, severe pain uncontrolled by current pain med regimen, or any new or worsening problems controlling bladder and bowel function.   ______________________________________________________________________    SUBJECTIVE:     She has finished PT without improvement. R arm is more numb. The arm feels weak. Having more neck pain and headaches. Headache on R side arches above the R ear.  Flexeril 5mg was helpful somewhat, she is out of it . Did not try 10mg  Meloxicam 7.5 gave her a lot of heartburn - could not tolerate that - stopped it.   Gabapentin 3 3x per day     Per previous visit with updated meds list  HPI  Elissa Grajeda  is a 48 year old female hx type 2 diabetes, anxiety, depression, neuropathy who presents today for new patient evaluation of neck pain into right arm     symptoms began gradually about 6 months ago with no trauma.  Patient woke up with pain in her right  hand.  At first, she thought this was maybe a carpal tunnel, but it began to extend up the arm into the shoulder and into the neck on the right.  It has been persistent and significant since then.    Pain is at a 6 out of 10 today, does get to a 9 out of 10 out of 10 by the end of the day.  Does not go below 6.  Pain is worse with prolonged sitting, she sits at a desk for work with head extended forward.  It is also worse with position of her neck.  She has noticed over the last month she has had numbness and tingling in the hand and all of the fingers.  Her right arm generally feels weak with lifting, particularly pouring large pitchers or lifting milk jugs. She saw her PCP last week  and was given prescription for naproxen. This has not helped.  Reports she was not recommended to take any p.o. steroids given history of diabetes.   She is on gabapentin chronically for anxiety.  She has been using IcyHot.      She had previous right ulnar nerve surgery 2 years ago and has some chronic mild irritation of her right elbow to palpation.    She is scheduled for physical therapy.  She has not had any previous injections or spine surgeries    Here with her  today     -Treatment to Date:     Physical Therapy  -full course 2024, discharged    -Medications:   naproxen  IcyHot  Gabapentin (for anxiety, depression)   Cymbalta  Flexeril 5mg  Meloxicam        Current Outpatient Medications   Medication Sig Dispense Refill     cyclobenzaprine (FLEXERIL) 10 MG tablet Take 1 tablet (10 mg) by mouth 3 times daily as needed for muscle spasms 45 tablet 0     naproxen (NAPROSYN) 500 MG tablet Take 1 tablet (500 mg) by mouth 2 times daily (with meals) 60 tablet 0     ACCU-CHEK GUIDE TEST STRIPS strips [ACCU-CHEK GUIDE TEST STRIPS STRIPS] USE TO TEST TWICE DAILY 200 strip 3     ARIPiprazole (ABILIFY) 10 MG tablet [ARIPIPRAZOLE (ABILIFY) 10 MG TABLET] Take 10 mg by mouth daily.        atorvastatin (LIPITOR) 20 MG tablet  [ATORVASTATIN (LIPITOR) 20 MG TABLET] TAKE 1 TABLET BY MOUTH EVERY DAY 90 tablet 3     benztropine (COGENTIN) 0.5 MG tablet [BENZTROPINE (COGENTIN) 0.5 MG TABLET] TAKE 1 TABLET BY MOUTH EVERYDAY AT BEDTIME       blood glucose meter (GLUCOMETER) [BLOOD GLUCOSE METER (GLUCOMETER)] Test TWICE daily. Dispense glucometer brand per patient's insurance at pharmacy discretion. 1 each 0     cyclobenzaprine (FLEXERIL) 5 MG tablet Take 1-2 tablets (5-10 mg) by mouth 3 times daily as needed for muscle spasms 45 tablet 0     DULoxetine (CYMBALTA) 60 MG capsule [DULOXETINE (CYMBALTA) 60 MG CAPSULE] Take 120 mg by mouth daily.        empagliflozin (JARDIANCE) 10 MG TABS tablet Take 1 tablet (10 mg) by mouth daily 90 tablet 3     gabapentin (NEURONTIN) 300 MG capsule Take 2 capsules (600 mg) by mouth 3 times daily 180 capsule 0     generic lancets (FINGERSTIX LANCETS) [GENERIC LANCETS (FINGERSTIX LANCETS)] Test TWICE daily. Dispense brand per patient's insurance at pharmacy discretion. 100 each 11     lisinopril (ZESTRIL) 10 MG tablet [LISINOPRIL (PRINIVIL,ZESTRIL) 10 MG TABLET] TAKE 1 TABLET BY MOUTH EVERY DAY 90 tablet 3     meloxicam (MOBIC) 7.5 MG tablet Take 1 tablet (7.5 mg) by mouth daily 30 tablet 0     metFORMIN (GLUCOPHAGE) 1000 MG tablet [METFORMIN (GLUCOPHAGE) 1000 MG TABLET] TAKE 1 TABLET BY MOUTH TWICE A DAY WITH MEALS 180 tablet 3     mirtazapine (REMERON) 15 MG tablet [MIRTAZAPINE (REMERON) 15 MG TABLET] TAKE 1 TABLET BY MOUTH EVERYDAY AT BEDTIME       naproxen (NAPROSYN) 500 MG tablet Take 1 tablet (500 mg) by mouth 2 times daily (with meals) 30 tablet 1     Current Facility-Administered Medications   Medication Dose Route Frequency Provider Last Rate Last Admin     lidocaine 1 % injection 3 mL  3 mL   Marielena Galaviz DO   3 mL at 12/11/23 1615     triamcinolone (KENALOG-40) injection 40 mg  40 mg   Marielena Galaviz DO   40 mg at 12/11/23 1615       No Known Allergies    Past Medical History:    Diagnosis Date     Diabetes (H)      Herniated disc      Spinal stenosis         Patient Active Problem List   Diagnosis     Poorly controlled type 2 diabetes mellitus (H)     Anxiety     Insomnia     Hyperlipidemia     Tobacco abuse     Obesity     Microalbuminuria due to type 2 diabetes mellitus (H)     Benign essential hypertension     Spinal stenosis, lumbar region, with neurogenic claudication     Osteoarthritis of left knee, unspecified osteoarthritis type-- xray Feb 2020     Obesity (BMI 35.0-39.9) with comorbidity (H)       No past surgical history on file.    Family History   Problem Relation Age of Onset     Coronary Artery Disease Father         78       Reviewed past medical, surgical, and family history with patient found on new patient intake packet located in EMR Media tab.     SOCIAL HX: smoker, alcohol use, no heavy drinking or rec drug use    Oswestry (TENZIN) Questionnaire:        3/15/2024     9:17 AM   OSWESTRY DISABILITY INDEX   Count 10   Sum 13   Oswestry Score (%) 26 %       Neck Disability Index:      4/1/2024     6:24 AM   Neck Disability Index (  Ang TIMMONS. and Jonatan PRINCE. 1991. All rights reserved.; used with permission)   SECTION 1 - PAIN INTENSITY 2   SECTION 2 - PERSONAL CARE 1   SECTION 4 - READING 2   SECTION 5 - HEADACHES 3   SECTION 6 - CONCENTRATION 2   SECTION 7 - WORK 2   SECTION 9 - SLEEPING 3   SECTION 10 - RECREATION 2   Count 8   Sum 17   Raw Score: /50 21.25   Neck Disability Index Score: (%) 42.5 %          PHQ-2 Score:       4/12/2022     7:37 PM   PHQ-2 ( 1999 [employer])   Q1: Little interest or pleasure in doing things 0   Q2: Feeling down, depressed or hopeless 0   PHQ-2 Score 0   Q1: Little interest or pleasure in doing things Not at all   Q2: Feeling down, depressed or hopeless Not at all   PHQ-2 Score 0          ROS: positive for headache, insomnia, anxiety, depression. Specifically negative for bowel/bladder dysfunction, balance changes, headache, dizziness, foot drop,  fevers, chills, appetite changes, nausea/vomiting, unexplained weight loss. Otherwise 13 systems reviewed are negative. Please see the patient's intake questionnaire from today for details.    OBJECTIVE:  /85   Pulse 118     PHYSICAL EXAMINATION:  --CONSTITUTIONAL: Vital signs as above. No acute distress. The patient is well nourished and well groomed.  --PSYCHIATRIC: The patient is awake, alert, oriented to person, place, and time, and answering questions appropriately with clear speech. Appropriate mood and affect   --HEENT: Sclera are non-injected. Extraocular muscles are intact. Moist oral mucosa.  --SKIN: Skin over the face, bilateral upper extremities, and posterior torso is clean, dry, intact without rashes.  --RESPIRATORY: Normal rhythm and effort. No abnormal accessory muscle breathing patterns noted.     --GROSS MOTOR: Easily arises from a seated position. Toe walking, heel walking, and tandem gait are normal.      --UPPER EXTREMITY MOTOR TESTING:  Shoulder abduction: right 5/5, left 5/5  Triceps: right 5/5 left 5/5  Biceps:right 5/5  left 5/5,   Hand :  right 5/5  left 5/5,   Intrinsics: right 5/5  left 5/5,   Extensors: right 5/5  left 5/5,     --LOWER EXTREMITY MOTOR TESTING  Hip flexion: right 5/5  left 5/5,   Quads:right 5/5  left 5/5,   Hamstrings: right 5/5  left 5/5,   Dorsiflexion: right 5/5  left 5/5,   Plantarflexion: right 5/5  left 5/5,   EHL: right 5/5  left 5/5,     REFLEXES: 2/4 symmetric triceps, biceps, brachioradialis bilaterally. 2/4 symmetric patellar, achilles reflex bilaterally.  Negative Clonus, Babinski, and Haney's bilaterally.      SENSATION: is decreased throughout the right upper extremity compared to the left. Some sensory loss right foot/leg as well. Otherwise intact throughout the left upper and lower extremities  --VASCULAR: Warm upper and lower limbs bilaterally.     --CERVICAL SPINE: Inspection reveals no evidence of deformity or swelling. Range of motion is  mildly limited in cervical flexion, extension, lateral rotation, head tilt. No point tenderness to palpation of cervical spine. Positive tenderness to palpation of traps and scaps. No  paraspinal musculature tenderness    Does have R occipital tenderness    --SHOULDERS: some pain with R shoulder ROM but ROM is full. Negative empty can. some tenderness R shoulder     --WRISTS: Full range of motion bilaterally, negative tinel and phalen signs bilaterally.      RESULTS:   Prior medical records from [facility] were reviewed today.         IMAGING:  Spine imaging was personally reviewed and interpreted today. The images were shown to the patient and the findings were explained using a spine model.     XR Cervical Spine 2/3 vws    Result Date: 3/12/2024  EXAM: XR CERVICAL SPINE 2/3 VIEWS LOCATION: [facility] DATE: 3/11/2024 INDICATION:  Cervical radiculopathy COMPARISON: None.     IMPRESSION: Normal vertebral body heights. The patient's head is mildly tilted to the left and there is mild broad cervicothoracic dextrocurvature, apex at C6-C7. There is also straightening of the usual cervical lordosis, though without spondylolisthesis. No radiographic evidence for acute fracture or traumatic subluxation. Mild interbody degenerative changes at C5-C6, with early disc space height loss and marginal osteophyte formation. Mild degenerative facet arthropathy in the mid/lower cervical spine, left greater than right. Mild degenerative change at the anterior and right lateral C1-C2 articulations. No prevertebral soft tissue swelling. Visualized lung apices appear unremarkable.          Marita Vernon FNP-C  [facility]  O. [phone]      Again, thank you for allowing me to participate in the care of your patient.        Sincerely,        Marita Vernon, AGUILA CNP

## 2024-06-19 ENCOUNTER — OFFICE VISIT (OUTPATIENT)
Dept: PHYSICAL MEDICINE AND REHAB | Facility: CLINIC | Age: 49
End: 2024-06-19
Attending: NURSE PRACTITIONER
Payer: OTHER GOVERNMENT

## 2024-06-19 DIAGNOSIS — M54.12 CERVICAL RADICULOPATHY: ICD-10-CM

## 2024-06-19 PROCEDURE — 95910 NRV CNDJ TEST 7-8 STUDIES: CPT | Performed by: PHYSICAL MEDICINE & REHABILITATION

## 2024-06-19 PROCEDURE — 95886 MUSC TEST DONE W/N TEST COMP: CPT | Performed by: PHYSICAL MEDICINE & REHABILITATION

## 2024-06-19 NOTE — LETTER
6/19/2024      Elissa Grajeda  [address] [place] [facility] [address]      Dear Colleague,    Thank you for referring your patient, Elissa Grajeda, to the [facility] SPINE AND NEUROSURGERY. Please see a copy of my visit note below.    [facility]  [address] [place] [address] [address]  [place] [address]  Office: [phone] Fax: [phone]    Electromyography and Nerve Conduction Study Report        Indication: Patient presents at the request of Marita Vernon CNP for right upper extremity EMG.  She has cervical spine pain with right arm pain and paresthesias to digits 3-5 in the right hand.  She is right-handed.  History of ulnar nerve release about 2 and half years ago at [facility].  On exam, she has decree sensation to light touch right deltoid, digit 3, 4, 5.  Symmetric 2+ bicep and brachioradialis reflexes with 1+ tricep reflex with negative Dorcas's.  Normal muscle strength throughout the major muscle groups of the bilateral upper extremities.      Pt Exam Discussion (Communication Barriers):  Electromyography and nerve conduction testing, including associated discomfort, risks, benefits, and alternatives was discussed with the patient prior to the procedure.  No learning/ communication barriers; patient verbalized understanding of procedure.  Informed consent was obtained.           Pt Assessment:  Testing was successfully completed; patient tolerated testing well.       Blood Thinners: None Skin Temperature: Warmed 31.7                     EMG/NCS  results:     Nerve Conduction Studies  Motor Sites      Segment Distal Latency Neg. Amp CV F-Latency F-Estimate Comment   Site  (ms) (mV) (m/s) (ms) (ms)    Right Median (APB) Motor   Wrist Wrist-APB 3.2 7.7       Elbow Elbow-Wrist 7.1 7.4 54      Right Ulnar (ADM) Motor   Wrist  2.6 11.0       Bel Elbow Bel Elbow-Wrist 6.3 10.3 54      Ab Elbow Ab Elbow-Wrist 8.9 10.2 50      Right Ulnar (FDI) Motor   Wrist Wrist-FDI 3.6 11.4  "      Bel Elbow Bel Elbow-Wrist 7.2 10.0 56      Elbow Elbow-Wrist 10.1 10.0 *50      Right Ulnar (FDI) Inching Motor   Bel Elbow\" Bel Elbow\"-FDI 7.2 8.6       Elb+2 Elb+2-Bel Elbow\" 7.6 10.0 -      Elb+3 Elb+3-Elb+2 8.3 9.9 -      Elb+4 Elb+4-Elb+3 9.2 10.0 -      Elb+5 Elb+5-Elb+4 9.8 9.7 -      Elb+6 Elb+6-Elb+5 10.2 7.0 -        Sensory Sites      Onset Lat Peak Lat Amp CV Comment   Site (ms) (ms) ( V) (m/s)    Right Median Anti Sensory   Wrist-Dig II 2.5 3.1 25 52    Right Median-Ulnar Palmar Sensory        Median   Palm-Wrist 1.60 2.1 79 50         Ulnar   Palm-Wrist 1.55 1.93 17 52    Right Radial Sensory   Forearm-Wrist 1.65 2.2 23 61    Right Ulnar Anti Sensory   Wrist-Dig V 2.1 2.6 16 52        NCS Waveforms:    Motor                Sensory                  Electromyography     Side Muscle Nerve Root Ins Act Fibs Psw Fasc Recrt Dur Amp Poly Comment   Right Deltoid Axillary C5-6 Nml Nml Nml Nml Nml Nml Nml 0    Right Triceps Radial C6-7-8 Nml Nml Nml Nml Nml Nml Nml 0    Right PronatorTeres Median C6-7 Nml Nml Nml Nml Nml Nml Nml 0    Right 1stDorInt Ulnar C8-T1 Nml Nml Nml Nml Nml Nml Nml 0    Right Abd Poll Brev Median C8-T1 Nml Nml Nml Nml Nml Nml Nml 0        Comment NCS: Abnormal study  1.  Mildly slowed amplitude of the right ulnar CMAP to the ADM and FDI across the elbow without amplitude drop.  Mild generalized slowing approximate 10 to 15% conduction velocity slowing from above to below the elbow with long segment calculation.  1 measuring by the short segment amplitude decreases by approximately 20%.  2.  Ulnar motor inching across the elbow recording at FDM: No focal slowing or amplitude drop across the medial epicondyle.  3.  Normal right median ulnar and radial SNAPs, ulnar transcarpal study, and  median CMAP.    Comment EMG: Normal study  1.  Normal needle EMG right upper extremity.    Interpretation: Abnormal study: There is electrodiagnostic evidence of:    1.  Electrodiagnostic findings are " consistent with a relatively mild right ulnar neuropathy localizing to the elbow.      2.  There is no electrodiagnostic evidence of cervical radiculopathy, brachial plexopathy, or median neuropathy at the wrist in the right upper extremity.    The testing was completed in its entirety by the physician.       It was our pleasure caring for your patient today, if there any questions or concerns please do not hesitate to contact us.    Again, thank you for allowing me to participate in the care of your patient.        Sincerely,        Onur Hannah, DO

## 2024-06-19 NOTE — PROGRESS NOTES
"[facility]  [address] [place] [address] [address]  [place] [address]  Office: [phone] Fax: [phone]    Electromyography and Nerve Conduction Study Report        Indication: Patient presents at the request of Marita Vernon CNP for right upper extremity EMG.  She has cervical spine pain with right arm pain and paresthesias to digits 3-5 in the right hand.  She is right-handed.  History of ulnar nerve release about 2 and half years ago at [facility].  On exam, she has decree sensation to light touch right deltoid, digit 3, 4, 5.  Symmetric 2+ bicep and brachioradialis reflexes with 1+ tricep reflex with negative Dorcas's.  Normal muscle strength throughout the major muscle groups of the bilateral upper extremities.      Pt Exam Discussion (Communication Barriers):  Electromyography and nerve conduction testing, including associated discomfort, risks, benefits, and alternatives was discussed with the patient prior to the procedure.  No learning/ communication barriers; patient verbalized understanding of procedure.  Informed consent was obtained.           Pt Assessment:  Testing was successfully completed; patient tolerated testing well.       Blood Thinners: None Skin Temperature: Warmed 31.7                     EMG/NCS  results:     Nerve Conduction Studies  Motor Sites      Segment Distal Latency Neg. Amp CV F-Latency F-Estimate Comment   Site  (ms) (mV) (m/s) (ms) (ms)    Right Median (APB) Motor   Wrist Wrist-APB 3.2 7.7       Elbow Elbow-Wrist 7.1 7.4 54      Right Ulnar (ADM) Motor   Wrist  2.6 11.0       Bel Elbow Bel Elbow-Wrist 6.3 10.3 54      Ab Elbow Ab Elbow-Wrist 8.9 10.2 50      Right Ulnar (FDI) Motor   Wrist Wrist-FDI 3.6 11.4       Bel Elbow Bel Elbow-Wrist 7.2 10.0 56      Elbow Elbow-Wrist 10.1 10.0 *50      Right Ulnar (FDI) Inching Motor   Bel Elbow\" Bel Elbow\"-FDI 7.2 8.6       Elb+2 Elb+2-Bel Elbow\" 7.6 10.0 -      Elb+3 Elb+3-Elb+2 8.3 9.9 -      Elb+4 " Elb+4-Elb+3 9.2 10.0 -      Elb+5 Elb+5-Elb+4 9.8 9.7 -      Elb+6 Elb+6-Elb+5 10.2 7.0 -        Sensory Sites      Onset Lat Peak Lat Amp CV Comment   Site (ms) (ms) ( V) (m/s)    Right Median Anti Sensory   Wrist-Dig II 2.5 3.1 25 52    Right Median-Ulnar Palmar Sensory        Median   Palm-Wrist 1.60 2.1 79 50         Ulnar   Palm-Wrist 1.55 1.93 17 52    Right Radial Sensory   Forearm-Wrist 1.65 2.2 23 61    Right Ulnar Anti Sensory   Wrist-Dig V 2.1 2.6 16 52        NCS Waveforms:    Motor                Sensory                  Electromyography     Side Muscle Nerve Root Ins Act Fibs Psw Fasc Recrt Dur Amp Poly Comment   Right Deltoid Axillary C5-6 Nml Nml Nml Nml Nml Nml Nml 0    Right Triceps Radial C6-7-8 Nml Nml Nml Nml Nml Nml Nml 0    Right PronatorTeres Median C6-7 Nml Nml Nml Nml Nml Nml Nml 0    Right 1stDorInt Ulnar C8-T1 Nml Nml Nml Nml Nml Nml Nml 0    Right Abd Poll Brev Median C8-T1 Nml Nml Nml Nml Nml Nml Nml 0        Comment NCS: Abnormal study  1.  Mildly slowed amplitude of the right ulnar CMAP to the ADM and FDI across the elbow without amplitude drop.  Mild generalized slowing approximate 10 to 15% conduction velocity slowing from above to below the elbow with long segment calculation.  1 measuring by the short segment amplitude decreases by approximately 20%.  2.  Ulnar motor inching across the elbow recording at FDM: No focal slowing or amplitude drop across the medial epicondyle.  3.  Normal right median ulnar and radial SNAPs, ulnar transcarpal study, and  median CMAP.    Comment EMG: Normal study  1.  Normal needle EMG right upper extremity.    Interpretation: Abnormal study: There is electrodiagnostic evidence of:    1.  Electrodiagnostic findings are consistent with a relatively mild right ulnar neuropathy localizing to the elbow.      2.  There is no electrodiagnostic evidence of cervical radiculopathy, brachial plexopathy, or median neuropathy at the wrist in the right upper  extremity.    The testing was completed in its entirety by the physician.       It was our pleasure caring for your patient today, if there any questions or concerns please do not hesitate to contact us.

## 2024-06-19 NOTE — PATIENT INSTRUCTIONS
Thank you for choosing the [facility] for your EMG testing.    The ordering provider will receive your final EMG results within the next few days.  Please follow up with your provider for the results and further treatment recommendations.

## 2024-06-30 ENCOUNTER — HOSPITAL ENCOUNTER (OUTPATIENT)
Dept: MRI IMAGING | Facility: HOSPITAL | Age: 49
Discharge: HOME | End: 2024-06-30
Attending: NURSE PRACTITIONER | Admitting: NURSE PRACTITIONER
Payer: OTHER GOVERNMENT

## 2024-06-30 DIAGNOSIS — M54.12 CERVICAL RADICULOPATHY: ICD-10-CM

## 2024-06-30 PROCEDURE — 72141 MRI NECK SPINE W/O DYE: CPT

## 2024-12-08 ENCOUNTER — HEALTH MAINTENANCE LETTER (OUTPATIENT)
Age: 49
End: 2024-12-08

## 2025-03-07 ENCOUNTER — TRANSFERRED RECORDS (OUTPATIENT)
Dept: HEALTH INFORMATION MANAGEMENT | Facility: CLINIC | Age: 50
End: 2025-03-07
Payer: OTHER GOVERNMENT

## 2025-03-18 ENCOUNTER — VIRTUAL VISIT (OUTPATIENT)
Dept: INTERNAL MEDICINE | Facility: CLINIC | Age: 50
End: 2025-03-18

## 2025-03-18 DIAGNOSIS — Z79.4 TYPE 2 DIABETES MELLITUS WITH HYPERGLYCEMIA, WITH LONG-TERM CURRENT USE OF INSULIN (H): Primary | ICD-10-CM

## 2025-03-18 DIAGNOSIS — E11.29 TYPE 2 DIABETES MELLITUS WITH OTHER DIABETIC KIDNEY COMPLICATION, WITHOUT LONG-TERM CURRENT USE OF INSULIN (H): ICD-10-CM

## 2025-03-18 DIAGNOSIS — E11.65 TYPE 2 DIABETES MELLITUS WITH HYPERGLYCEMIA, WITH LONG-TERM CURRENT USE OF INSULIN (H): Primary | ICD-10-CM

## 2025-03-18 PROBLEM — G89.29 CHRONIC SI JOINT PAIN: Status: ACTIVE | Noted: 2018-01-22

## 2025-03-18 PROBLEM — M53.3 CHRONIC SI JOINT PAIN: Status: ACTIVE | Noted: 2018-01-22

## 2025-03-18 PROCEDURE — 98005 SYNCH AUDIO-VIDEO EST LOW 20: CPT | Performed by: NURSE PRACTITIONER

## 2025-03-18 RX ORDER — KETOROLAC TROMETHAMINE 30 MG/ML
1 INJECTION, SOLUTION INTRAMUSCULAR; INTRAVENOUS ONCE
Qty: 1 EACH | Refills: 0 | Status: SHIPPED | OUTPATIENT
Start: 2025-03-18 | End: 2025-03-18

## 2025-03-18 RX ORDER — GLIPIZIDE 10 MG/1
TABLET, FILM COATED, EXTENDED RELEASE ORAL
Qty: 30 TABLET | Refills: 0 | Status: SHIPPED | OUTPATIENT
Start: 2025-03-18

## 2025-03-18 RX ORDER — HYDROCHLOROTHIAZIDE 12.5 MG/1
CAPSULE ORAL
Qty: 6 EACH | Refills: 3 | Status: SHIPPED | OUTPATIENT
Start: 2025-03-18 | End: 2025-03-18

## 2025-03-18 RX ORDER — HYDROCHLOROTHIAZIDE 12.5 MG/1
CAPSULE ORAL
Qty: 6 EACH | Refills: 3 | Status: SHIPPED | OUTPATIENT
Start: 2025-03-18

## 2025-03-18 RX ORDER — GLIPIZIDE 10 MG/1
10 TABLET, FILM COATED, EXTENDED RELEASE ORAL DAILY
Qty: 90 TABLET | Refills: 1 | Status: SHIPPED | OUTPATIENT
Start: 2025-03-18

## 2025-03-18 NOTE — PATIENT INSTRUCTIONS
Start glipizide 10 mg extended release, 1 tablet with dinner every evening.    Continue on metformin.    No more soda. No more juice.     Increase Jardiance to 25 mg.    I sent a prescription for continuous glucometer to your pharmacy.  It is for the freestyle kyler 3.    Please call the diabetes educator and schedule a follow-up appointment with them.    I will have my nurse reach out to you and help you schedule a follow-up appointment with Dr. Vega in 1 month.

## 2025-03-18 NOTE — PROGRESS NOTES
Elissa is a 49 year old who is being evaluated via a billable video visit.    How would you like to obtain your AVS? MyChart  If the video visit is dropped, the invitation should be resent by: Text to cell phone: [phone]  Will anyone else be joining your video visit? No      Assessment & Plan     Type 2 diabetes mellitus with hyperglycemia, with long-term current use of insulin (H)    Elissa had a preop appointment last week with a colleague.    At that time, she had a hemoglobin A1c checked which came back greater than 14%.    She tells me that she has been drinking copious amounts of soda and other sugary beverages.  I explained to her in no uncertain terms that she needed to stop doing this immediately.    We had an honest conversation about starting insulin.  She is quite hesitant about this.    We also had a conversation about restarting a GLP-1 agonist, although she is hesitant about this given that she has had unpleasant side effects with Trulicity.    She wants to restart glipizide.  I reviewed charting from her PCP, Dr. Vega.  Dr. Vega has been hesitant about restarting glipizide given weight gain possibilities.    I asked that she take the extended release glipizide (10 mg XL) with the largest meal of the day, preferably dinner.    Increase Jardiance from 10 mg to 25 mg.    I gave her the telephone number for the diabetes educators.    I also sent in a prescription for continuous glucometer, freestyle kyler 3.    Plan is to have her follow-up with her PCP in 1 month to review blood sugars.  I also told her that she should plan on rescheduling her surgery for later this summer, after we get a solid hold on her diabetes.  - glipiZIDE (GLUCOTROL XL) 10 MG 24 hr tablet; Take one tablet daily with dinner  - glipiZIDE (GLUCOTROL XL) 10 MG 24 hr tablet; Take 1 tablet (10 mg) by mouth daily.  - Adult Diabetes Education  Referral; Future  - empagliflozin (JARDIANCE) 25 MG TABS tablet; Take 1 tablet (25  mg) by mouth daily.  - Continuous Glucose  (FREESTYLE KYLER 3 READER) SHIRA; 1 each once for 1 dose. Use to read blood sugars per 's instructions.  - Continuous Glucose Sensor (FREESTYLE KYLER 3 PLUS SENSOR) MISC; Use 1 sensor every 15 days. Use to read blood sugars per 's instructions.    Patient Instructions   Start glipizide 10 mg extended release, 1 tablet with dinner every evening.    Continue on metformin.    No more soda. No more juice.     Increase Jardiance to 25 mg.    I sent a prescription for continuous glucometer to your pharmacy.  It is for the freestyle kyler 3.    Please call the diabetes educator and schedule a follow-up appointment with them.    I will have my nurse reach out to you and help you schedule a follow-up appointment with Dr. Vega in 1 month.      The longitudinal plan of care for the diagnosis(es)/condition(s) as documented were addressed during this visit. Due to the added complexity in care, I will continue to support Elissa in the subsequent management and with ongoing continuity of care.                  Maryan Bowers is a 49 year old, presenting for the following health issues:    Results (Go over A1c(14.3) done on 3-14, scheduled for Ulnar nerve release on 3-25)      3/18/2025     8:48 AM   Additional Questions   Roomed by Sara STALEY     History of Present Illness       Diabetes:   She presents for follow up of diabetes.    She is not checking blood glucose.        She is concerned about blood sugar frequently over 200.   She is having excessive thirst.  The patient has not had a diabetic eye exam in the last 12 months.          She eats 0-1 servings of fruits and vegetables daily.She consumes 2 sweetened beverage(s) daily.She exercises with enough effort to increase her heart rate 10 to 19 minutes per day.  She exercises with enough effort to increase her heart rate 4 days per week. She is missing 2 dose(s) of medications per week.  She is not  taking prescribed medications regularly due to remembering to take and other.                    Objective           Vitals:  No vitals were obtained today due to virtual visit.    Physical Exam   GENERAL: alert and no distress  EYES: Eyes grossly normal to inspection.  No discharge or erythema, or obvious scleral/conjunctival abnormalities.  RESP: No audible wheeze, cough, or visible cyanosis.    SKIN: Visible skin clear. No significant rash, abnormal pigmentation or lesions.  NEURO: Cranial nerves grossly intact.  Mentation and speech appropriate for age.  PSYCH: Appropriate affect, tone, and pace of words          Video-Visit Details    Type of service:  Video Visit   Originating Location (pt. Location): Home    Distant Location (provider location):  On-site  Platform used for Video Visit: Doxingris  Signed Electronically by: Abiel Harper CNP

## 2025-03-24 ENCOUNTER — OFFICE VISIT (OUTPATIENT)
Dept: CARDIOLOGY | Facility: CLINIC | Age: 50
End: 2025-03-24
Payer: OTHER GOVERNMENT

## 2025-03-24 VITALS
SYSTOLIC BLOOD PRESSURE: 122 MMHG | HEIGHT: 64 IN | DIASTOLIC BLOOD PRESSURE: 72 MMHG | HEART RATE: 76 BPM | BODY MASS INDEX: 32.11 KG/M2 | WEIGHT: 188.1 LBS | RESPIRATION RATE: 16 BRPM

## 2025-03-24 DIAGNOSIS — R94.31 NONSPECIFIC ABNORMAL ELECTROCARDIOGRAM (ECG) (EKG): Primary | ICD-10-CM

## 2025-03-24 PROBLEM — M17.12 OSTEOARTHRITIS OF LEFT KNEE, UNSPECIFIED OSTEOARTHRITIS TYPE: Status: RESOLVED | Noted: 2020-05-27 | Resolved: 2025-03-24

## 2025-03-24 PROCEDURE — G2211 COMPLEX E/M VISIT ADD ON: HCPCS | Performed by: INTERNAL MEDICINE

## 2025-03-24 PROCEDURE — 99204 OFFICE O/P NEW MOD 45 MIN: CPT | Performed by: INTERNAL MEDICINE

## 2025-03-24 RX ORDER — UBIDECARENONE 75 MG
100 CAPSULE ORAL DAILY
COMMUNITY

## 2025-03-24 RX ORDER — THERA TABS 400 MCG
1 TAB ORAL DAILY
COMMUNITY

## 2025-03-24 NOTE — PROGRESS NOTES
Thank you, Dr. Ardon, for asking [facility] to evaluate Ms. Elissa Grajeda.      Assessment/Recommendations   Assessment:    Abnormal EKG(poor R wave progression and borderline inferior Q waves) suspect related to body habitus,  not substantially different from old EKGs from 2021 and 2014  Ulnar neuropathy  Diabetes mellitus poorly controlled due to noncompliance  Tobacco use    Plan:  She has no cardiac symptoms and average exercise tolerance.  She can undergo surgery at low risk of cardiac complications.    Echo to look for wall motion abnormalities(will be surprised to see any)      We discussed the importance of risk factors control to avoid development of obstructive coronary artery disease.  I urged her to quit smoking and adhere to diabetic treatment.      The longitudinal plan of care for the diagnosis(es)/condition(s) as documented were addressed during this visit. Due to the added complexity in care, I will continue to support Elissa in the subsequent management and with ongoing continuity of care.      History of Present Illness    Ms. Elissa Grajeda is a 49 year old female who comes in for cardiac evaluation before ulnar nerve decompression surgery.  She went for preop evaluation.  EKG was obtained and was read as a possible anterior inferior MI.  The patient denies any history of heart attacks.  She never had any exertional chest pain.  She denies exertional shortness of breath.  She has fair exercise tolerance.  She can walk her dogs for about 2 miles without any chest symptoms.  Her weight has been stable.  She has no PND and orthopnea.  She denies heart palpitations syncope.    She has never had an extensive cardiac testing.    ECG: Personally reviewed.  Normal sinus rhythm poor R wave progression small Q waves seen in 3 and aVF.         Physical Examination Review of Systems   /72 (BP Location: Left arm, Patient Position: Sitting, Cuff Size: Adult Large)    "Pulse 76   Resp 16   Ht 1.626 m (5' 4\")   Wt 85.3 kg (188 lb 1.6 oz)   BMI 32.29 kg/m    Body mass index is 32.29 kg/m .  Wt Readings from Last 3 Encounters:   03/24/25 85.3 kg (188 lb 1.6 oz)   03/14/25 85.5 kg (188 lb 6.4 oz)   02/12/24 92.5 kg (204 lb)     General Appearance:   Alert, cooperative, no distress, appears stated age   Head/ENT: Normocephalic, without obvious abnormality. Membranes moist      EYES:  no scleral icterus, normal conjunctivae   Neck: Supple, symmetrical, trachea midline, no adenopathy, thyroid: not enlarged, symmetric, no carotid bruit or JVD   Chest/Lungs:   Lungs are clear to auscultation, respirations unlabored. No tenderness or deformity    Cardiovascular:   Regular rhythm, S1, S2 normal, no murmur, rub or gallop.   Abdomen:  Soft, non-tender, bowel sounds active all four quadrants,  no masses, no organomegaly   Extremities: no cyanosis or clubbing. No edema   Skin: Skin color, texture, turgor normal, no rashes or lesions.    Psychiatric: Normal affect, calm   Neurologic: Alert and oriented x 3, moving all four extremities.     Encounter Vitals  BP: 122/72  Pulse: 76  Resp: 16  Weight: 85.3 kg (188 lb 1.6 oz)  Height: 162.6 cm (5' 4\")                                          Lab Results    Chemistry/lipid CBC Cardiac Enzymes/BNP/TSH/INR   Recent Labs   Lab Test 11/09/23  0749   CHOL 158   HDL 37*   LDL 78   TRIG 217*     Recent Labs   Lab Test 11/09/23  0749 07/13/23  0910 04/15/22  1417   LDL 78 170* 200*     Recent Labs   Lab Test 03/14/25  1617      POTASSIUM 4.2   CHLORIDE 103   CO2 21*   *   BUN 12.2   CR 0.49*   GFRESTIMATED >90   GURWINDER 10.3     Recent Labs   Lab Test 03/14/25  1617 11/09/23  0749 07/13/23  0910   CR 0.49* 0.58 0.58     Recent Labs   Lab Test 03/14/25  1617 11/09/23  0749 07/13/23  0910   A1C 14.3* 10.7* 12.2*          Recent Labs   Lab Test 03/14/25  1617 11/09/23  0749   WBC  --  15.0*   HGB 15.4 15.4   HCT  --  44.4   MCV  --  91   PLT  --  294 " "    Recent Labs   Lab Test 03/14/25  1617 11/09/23  0749 07/13/23  0910   HGB 15.4 15.4 15.8*    No results for input(s): \"TROPONINI\" in the last 37583 hours.  No results for input(s): \"BNP\", \"NTBNPI\", \"NTBNP\" in the last 49866 hours.  Recent Labs   Lab Test 11/09/23  0749   TSH 4.45*     No results for input(s): \"INR\" in the last 03230 hours.     Medical History  Surgical History Family History Social History   Past Medical History:   Diagnosis Date    Diabetes (H)     Herniated disc     Spinal stenosis      No past surgical history on file.  No premature CAD, SCD,cardiomyopathy   Social History     Socioeconomic History    Marital status:      Spouse name: Not on file    Number of children: Not on file    Years of education: Not on file    Highest education level: Not on file   Occupational History    Not on file   Tobacco Use    Smoking status: Every Day     Current packs/day: 1.00     Average packs/day: 1 pack/day for 20.0 years (20.0 ttl pk-yrs)     Types: Cigarettes     Passive exposure: Current    Smokeless tobacco: Never   Vaping Use    Vaping status: Never Used   Substance and Sexual Activity    Alcohol use: Not on file    Drug use: Not on file    Sexual activity: Not on file   Other Topics Concern    Not on file   Social History Narrative    Not on file     Social Drivers of Health     Financial Resource Strain: Low Risk  (11/8/2023)    Financial Resource Strain     Within the past 12 months, have you or your family members you live with been unable to get utilities (heat, electricity) when it was really needed?: No   Food Insecurity: Low Risk  (11/8/2023)    Food Insecurity     Within the past 12 months, did you worry that your food would run out before you got money to buy more?: No     Within the past 12 months, did the food you bought just not last and you didn t have money to get more?: No   Transportation Needs: Low Risk  (11/8/2023)    Transportation Needs     Within the past 12 months, has " lack of transportation kept you from medical appointments, getting your medicines, non-medical meetings or appointments, work, or from getting things that you need?: No   Physical Activity: Not on file   Stress: Not on file   Social Connections: Not on file   Interpersonal Safety: Low Risk  (11/9/2023)    Interpersonal Safety     Do you feel physically and emotionally safe where you currently live?: Yes     Within the past 12 months, have you been hit, slapped, kicked or otherwise physically hurt by someone?: No     Within the past 12 months, have you been humiliated or emotionally abused in other ways by your partner or ex-partner?: No   Housing Stability: Low Risk  (11/8/2023)    Housing Stability     Do you have housing? : Yes     Are you worried about losing your housing?: No         Medications  Allergies   Current Outpatient Medications   Medication Sig Dispense Refill    ARIPiprazole (ABILIFY) 10 MG tablet [ARIPIPRAZOLE (ABILIFY) 10 MG TABLET] Take 10 mg by mouth daily.       atorvastatin (LIPITOR) 20 MG tablet [ATORVASTATIN (LIPITOR) 20 MG TABLET] TAKE 1 TABLET BY MOUTH EVERY DAY 90 tablet 3    benztropine (COGENTIN) 0.5 MG tablet [BENZTROPINE (COGENTIN) 0.5 MG TABLET] TAKE 1 TABLET BY MOUTH EVERYDAY AT BEDTIME      cholecalciferol (VITAMIN D3) 25 mcg (1000 units) capsule Take 1 capsule by mouth daily.      Continuous Glucose Sensor (FREESTYLE IVAN 3 PLUS SENSOR) MISC Use 1 sensor every 15 days. Use to read blood sugars per 's instructions. 6 each 3    cyanocobalamin (VITAMIN B-12) 100 MCG tablet Take 100 mcg by mouth daily.      DULoxetine (CYMBALTA) 60 MG capsule [DULOXETINE (CYMBALTA) 60 MG CAPSULE] Take 120 mg by mouth daily.       empagliflozin (JARDIANCE) 25 MG TABS tablet Take 1 tablet (25 mg) by mouth daily. 90 tablet 1    glipiZIDE (GLUCOTROL XL) 10 MG 24 hr tablet Take 1 tablet (10 mg) by mouth daily. 90 tablet 1    lisinopril (ZESTRIL) 10 MG tablet [LISINOPRIL (PRINIVIL,ZESTRIL) 10 MG  TABLET] TAKE 1 TABLET BY MOUTH EVERY DAY 90 tablet 3    metFORMIN (GLUCOPHAGE) 1000 MG tablet [METFORMIN (GLUCOPHAGE) 1000 MG TABLET] TAKE 1 TABLET BY MOUTH TWICE A DAY WITH MEALS 180 tablet 3    mirtazapine (REMERON) 15 MG tablet [MIRTAZAPINE (REMERON) 15 MG TABLET] TAKE 1 TABLET BY MOUTH EVERYDAY AT BEDTIME      multivitamin, therapeutic (THERA-VIT) TABS tablet Take 1 tablet by mouth daily.        No Known Allergies

## 2025-03-24 NOTE — LETTER
3/24/2025    DENISA NAVARRO MD  [address] [place] [address]    RE: Elissa Grajeda       Dear Colleague,     I had the pleasure of seeing Elissa Grajeda in the [facility].        Thank you, Dr. Ardon, for asking [facility] to evaluate Ms. Elissa Garjeda.      Assessment/Recommendations   Assessment:    Abnormal EKG(poor R wave progression and borderline inferior Q waves) suspect related to body habitus,  not substantially different from old EKGs from 2021 and 2014  Ulnar neuropathy  Diabetes mellitus poorly controlled due to noncompliance  Tobacco use    Plan:  She has no cardiac symptoms and average exercise tolerance.  She can undergo surgery at low risk of cardiac complications.    Echo to look for wall motion abnormalities(will be surprised to see any)      We discussed the importance of risk factors control to avoid development of obstructive coronary artery disease.  I urged her to quit smoking and adhere to diabetic treatment.      The longitudinal plan of care for the diagnosis(es)/condition(s) as documented were addressed during this visit. Due to the added complexity in care, I will continue to support Elissa in the subsequent management and with ongoing continuity of care.      History of Present Illness    Ms. Elissa Grajeda is a 49 year old female who comes in for cardiac evaluation before ulnar nerve decompression surgery.  She went for preop evaluation.  EKG was obtained and was read as a possible anterior inferior MI.  The patient denies any history of heart attacks.  She never had any exertional chest pain.  She denies exertional shortness of breath.  She has fair exercise tolerance.  She can walk her dogs for about 2 miles without any chest symptoms.  Her weight has been stable.  She has no PND and orthopnea.  She denies heart palpitations syncope.    She has never had an extensive cardiac testing.    ECG: Personally reviewed.  Normal  "sinus rhythm poor R wave progression small Q waves seen in 3 and aVF.         Physical Examination Review of Systems   /72 (BP Location: Left arm, Patient Position: Sitting, Cuff Size: Adult Large)   Pulse 76   Resp 16   Ht 1.626 m (5' 4\")   Wt 85.3 kg (188 lb 1.6 oz)   BMI 32.29 kg/m    Body mass index is 32.29 kg/m .  Wt Readings from Last 3 Encounters:   03/24/25 85.3 kg (188 lb 1.6 oz)   03/14/25 85.5 kg (188 lb 6.4 oz)   02/12/24 92.5 kg (204 lb)     General Appearance:   Alert, cooperative, no distress, appears stated age   Head/ENT: Normocephalic, without obvious abnormality. Membranes moist      EYES:  no scleral icterus, normal conjunctivae   Neck: Supple, symmetrical, trachea midline, no adenopathy, thyroid: not enlarged, symmetric, no carotid bruit or JVD   Chest/Lungs:   Lungs are clear to auscultation, respirations unlabored. No tenderness or deformity    Cardiovascular:   Regular rhythm, S1, S2 normal, no murmur, rub or gallop.   Abdomen:  Soft, non-tender, bowel sounds active all four quadrants,  no masses, no organomegaly   Extremities: no cyanosis or clubbing. No edema   Skin: Skin color, texture, turgor normal, no rashes or lesions.    Psychiatric: Normal affect, calm   Neurologic: Alert and oriented x 3, moving all four extremities.     Encounter Vitals  BP: 122/72  Pulse: 76  Resp: 16  Weight: 85.3 kg (188 lb 1.6 oz)  Height: 162.6 cm (5' 4\")                                          Lab Results    Chemistry/lipid CBC Cardiac Enzymes/BNP/TSH/INR   Recent Labs   Lab Test 11/09/23  0749   CHOL 158   HDL 37*   LDL 78   TRIG 217*     Recent Labs   Lab Test 11/09/23  0749 07/13/23  0910 04/15/22  1417   LDL 78 170* 200*     Recent Labs   Lab Test 03/14/25  1617      POTASSIUM 4.2   CHLORIDE 103   CO2 21*   *   BUN 12.2   CR 0.49*   GFRESTIMATED >90   GURWINDER 10.3     Recent Labs   Lab Test 03/14/25  1617 11/09/23  0749 07/13/23  0910   CR 0.49* 0.58 0.58     Recent Labs   Lab Test " "03/14/25  1617 11/09/23  0749 07/13/23  0910   A1C 14.3* 10.7* 12.2*          Recent Labs   Lab Test 03/14/25  1617 11/09/23  0749   WBC  --  15.0*   HGB 15.4 15.4   HCT  --  44.4   MCV  --  91   PLT  --  294     Recent Labs   Lab Test 03/14/25  1617 11/09/23  0749 07/13/23  0910   HGB 15.4 15.4 15.8*    No results for input(s): \"TROPONINI\" in the last 75227 hours.  No results for input(s): \"BNP\", \"NTBNPI\", \"NTBNP\" in the last 07585 hours.  Recent Labs   Lab Test 11/09/23  0749   TSH 4.45*     No results for input(s): \"INR\" in the last 34236 hours.     Medical History  Surgical History Family History Social History   Past Medical History:   Diagnosis Date     Diabetes (H)      Herniated disc      Spinal stenosis      No past surgical history on file.  No premature CAD, SCD,cardiomyopathy   Social History     Socioeconomic History     Marital status:      Spouse name: Not on file     Number of children: Not on file     Years of education: Not on file     Highest education level: Not on file   Occupational History     Not on file   Tobacco Use     Smoking status: Every Day     Current packs/day: 1.00     Average packs/day: 1 pack/day for 20.0 years (20.0 ttl pk-yrs)     Types: Cigarettes     Passive exposure: Current     Smokeless tobacco: Never   Vaping Use     Vaping status: Never Used   Substance and Sexual Activity     Alcohol use: Not on file     Drug use: Not on file     Sexual activity: Not on file   Other Topics Concern     Not on file   Social History Narrative     Not on file     Social Drivers of Health     Financial Resource Strain: Low Risk  (11/8/2023)    Financial Resource Strain      Within the past 12 months, have you or your family members you live with been unable to get utilities (heat, electricity) when it was really needed?: No   Food Insecurity: Low Risk  (11/8/2023)    Food Insecurity      Within the past 12 months, did you worry that your food would run out before you got money to buy " more?: No      Within the past 12 months, did the food you bought just not last and you didn t have money to get more?: No   Transportation Needs: Low Risk  (11/8/2023)    Transportation Needs      Within the past 12 months, has lack of transportation kept you from medical appointments, getting your medicines, non-medical meetings or appointments, work, or from getting things that you need?: No   Physical Activity: Not on file   Stress: Not on file   Social Connections: Not on file   Interpersonal Safety: Low Risk  (11/9/2023)    Interpersonal Safety      Do you feel physically and emotionally safe where you currently live?: Yes      Within the past 12 months, have you been hit, slapped, kicked or otherwise physically hurt by someone?: No      Within the past 12 months, have you been humiliated or emotionally abused in other ways by your partner or ex-partner?: No   Housing Stability: Low Risk  (11/8/2023)    Housing Stability      Do you have housing? : Yes      Are you worried about losing your housing?: No         Medications  Allergies   Current Outpatient Medications   Medication Sig Dispense Refill     ARIPiprazole (ABILIFY) 10 MG tablet [ARIPIPRAZOLE (ABILIFY) 10 MG TABLET] Take 10 mg by mouth daily.        atorvastatin (LIPITOR) 20 MG tablet [ATORVASTATIN (LIPITOR) 20 MG TABLET] TAKE 1 TABLET BY MOUTH EVERY DAY 90 tablet 3     benztropine (COGENTIN) 0.5 MG tablet [BENZTROPINE (COGENTIN) 0.5 MG TABLET] TAKE 1 TABLET BY MOUTH EVERYDAY AT BEDTIME       cholecalciferol (VITAMIN D3) 25 mcg (1000 units) capsule Take 1 capsule by mouth daily.       Continuous Glucose Sensor (FREESTYLE IVAN 3 PLUS SENSOR) MISC Use 1 sensor every 15 days. Use to read blood sugars per 's instructions. 6 each 3     cyanocobalamin (VITAMIN B-12) 100 MCG tablet Take 100 mcg by mouth daily.       DULoxetine (CYMBALTA) 60 MG capsule [DULOXETINE (CYMBALTA) 60 MG CAPSULE] Take 120 mg by mouth daily.        empagliflozin  (JARDIANCE) 25 MG TABS tablet Take 1 tablet (25 mg) by mouth daily. 90 tablet 1     glipiZIDE (GLUCOTROL XL) 10 MG 24 hr tablet Take 1 tablet (10 mg) by mouth daily. 90 tablet 1     lisinopril (ZESTRIL) 10 MG tablet [LISINOPRIL (PRINIVIL,ZESTRIL) 10 MG TABLET] TAKE 1 TABLET BY MOUTH EVERY DAY 90 tablet 3     metFORMIN (GLUCOPHAGE) 1000 MG tablet [METFORMIN (GLUCOPHAGE) 1000 MG TABLET] TAKE 1 TABLET BY MOUTH TWICE A DAY WITH MEALS 180 tablet 3     mirtazapine (REMERON) 15 MG tablet [MIRTAZAPINE (REMERON) 15 MG TABLET] TAKE 1 TABLET BY MOUTH EVERYDAY AT BEDTIME       multivitamin, therapeutic (THERA-VIT) TABS tablet Take 1 tablet by mouth daily.        No Known Allergies                     Thank you for allowing me to participate in the care of your patient.      Sincerely,     Chucky Rivas MD     [facility]  cc:   Camille Ardon, NP  [address] [place] [address]

## 2025-03-31 ENCOUNTER — MYC REFILL (OUTPATIENT)
Dept: INTERNAL MEDICINE | Facility: CLINIC | Age: 50
End: 2025-03-31
Payer: OTHER GOVERNMENT

## 2025-03-31 DIAGNOSIS — E11.65 TYPE 2 DIABETES MELLITUS WITH HYPERGLYCEMIA, WITH LONG-TERM CURRENT USE OF INSULIN (H): ICD-10-CM

## 2025-03-31 DIAGNOSIS — Z79.4 TYPE 2 DIABETES MELLITUS WITH HYPERGLYCEMIA, WITH LONG-TERM CURRENT USE OF INSULIN (H): ICD-10-CM

## 2025-04-01 RX ORDER — KETOROLAC TROMETHAMINE 30 MG/ML
1 INJECTION, SOLUTION INTRAMUSCULAR; INTRAVENOUS ONCE
Qty: 1 EACH | Refills: 0 | Status: SHIPPED | OUTPATIENT
Start: 2025-04-01 | End: 2025-04-01

## 2025-04-09 ENCOUNTER — HOSPITAL ENCOUNTER (OUTPATIENT)
Dept: CARDIOLOGY | Facility: CLINIC | Age: 50
Discharge: HOME | End: 2025-04-09
Attending: INTERNAL MEDICINE
Payer: OTHER GOVERNMENT

## 2025-04-09 DIAGNOSIS — R94.31 NONSPECIFIC ABNORMAL ELECTROCARDIOGRAM (ECG) (EKG): ICD-10-CM

## 2025-04-09 LAB — LVEF ECHO: NORMAL

## 2025-04-09 PROCEDURE — 93306 TTE W/DOPPLER COMPLETE: CPT | Mod: 26 | Performed by: INTERNAL MEDICINE

## 2025-04-09 PROCEDURE — 93306 TTE W/DOPPLER COMPLETE: CPT
